# Patient Record
Sex: FEMALE | Race: WHITE | NOT HISPANIC OR LATINO | Employment: OTHER | URBAN - METROPOLITAN AREA
[De-identification: names, ages, dates, MRNs, and addresses within clinical notes are randomized per-mention and may not be internally consistent; named-entity substitution may affect disease eponyms.]

---

## 2023-08-08 ENCOUNTER — HOSPITAL ENCOUNTER (OUTPATIENT)
Dept: DATA CONVERSION | Facility: HOSPITAL | Age: 73
End: 2023-08-08

## 2023-08-28 ENCOUNTER — HOSPITAL ENCOUNTER (OUTPATIENT)
Dept: DATA CONVERSION | Facility: HOSPITAL | Age: 73
Discharge: HOME | End: 2023-08-28
Payer: MEDICARE

## 2023-08-28 DIAGNOSIS — M48.062 SPINAL STENOSIS, LUMBAR REGION WITH NEUROGENIC CLAUDICATION: ICD-10-CM

## 2023-09-07 ENCOUNTER — HOSPITAL ENCOUNTER (OUTPATIENT)
Dept: DATA CONVERSION | Facility: HOSPITAL | Age: 73
End: 2023-09-07

## 2023-09-07 DIAGNOSIS — M17.11 UNILATERAL PRIMARY OSTEOARTHRITIS, RIGHT KNEE: ICD-10-CM

## 2023-09-14 ENCOUNTER — HOSPITAL ENCOUNTER (OUTPATIENT)
Dept: DATA CONVERSION | Facility: HOSPITAL | Age: 73
Discharge: HOME | End: 2023-09-14
Payer: MEDICARE

## 2023-09-14 DIAGNOSIS — E78.1 PURE HYPERGLYCERIDEMIA: ICD-10-CM

## 2023-09-14 DIAGNOSIS — I10 ESSENTIAL (PRIMARY) HYPERTENSION: ICD-10-CM

## 2023-09-14 DIAGNOSIS — E66.9 OBESITY, UNSPECIFIED: ICD-10-CM

## 2023-09-14 LAB
ALBUMIN SERPL-MCNC: 4 GM/DL (ref 3.5–5)
ALBUMIN/GLOB SERPL: 1.4 RATIO (ref 1.5–3)
ALP BLD-CCNC: 50 U/L (ref 35–125)
ALT SERPL-CCNC: 29 U/L (ref 5–40)
ANION GAP SERPL CALCULATED.3IONS-SCNC: 9 MMOL/L (ref 0–19)
APPEARANCE PLAS: NORMAL
AST SERPL-CCNC: 32 U/L (ref 5–40)
BASOPHILS # BLD AUTO: 0.03 K/UL (ref 0–0.22)
BASOPHILS NFR BLD AUTO: 0.4 % (ref 0–1)
BILIRUB SERPL-MCNC: 0.4 MG/DL (ref 0.1–1.2)
BUN SERPL-MCNC: 12 MG/DL (ref 8–25)
BUN/CREAT SERPL: 13.3 RATIO (ref 8–21)
CALCIUM SERPL-MCNC: 9.7 MG/DL (ref 8.5–10.4)
CHLORIDE SERPL-SCNC: 108 MMOL/L (ref 97–107)
CHOLEST SERPL-MCNC: 176 MG/DL (ref 133–200)
CHOLEST/HDLC SERPL: 2.3 RATIO
CO2 SERPL-SCNC: 28 MMOL/L (ref 24–31)
COLOR SPUN FLD: NORMAL
CREAT SERPL-MCNC: 0.9 MG/DL (ref 0.4–1.6)
DEPRECATED RDW RBC AUTO: 46.5 FL (ref 37–54)
DIFFERENTIAL METHOD BLD: ABNORMAL
EOSINOPHIL # BLD AUTO: 0.25 K/UL (ref 0–0.45)
EOSINOPHIL NFR BLD: 3.7 % (ref 0–3)
ERYTHROCYTE [DISTWIDTH] IN BLOOD BY AUTOMATED COUNT: 13.2 % (ref 11.7–15)
FASTING STATUS PATIENT QL REPORTED: NORMAL
GFR SERPL CREATININE-BSD FRML MDRD: 68 ML/MIN/1.73 M2
GLOBULIN SER-MCNC: 2.8 G/DL (ref 1.9–3.7)
GLUCOSE SERPL-MCNC: 97 MG/DL (ref 65–99)
HCT VFR BLD AUTO: 42.6 % (ref 36–44)
HDLC SERPL-MCNC: 77 MG/DL
HGB BLD-MCNC: 13.8 GM/DL (ref 12–15)
IMM GRANULOCYTES # BLD AUTO: 0.03 K/UL (ref 0–0.1)
LDLC SERPL CALC-MCNC: 82 MG/DL (ref 65–130)
LYMPHOCYTES # BLD AUTO: 1.5 K/UL (ref 1.2–3.2)
LYMPHOCYTES NFR BLD MANUAL: 22.1 % (ref 20–40)
MCH RBC QN AUTO: 31.3 PG (ref 26–34)
MCHC RBC AUTO-ENTMCNC: 32.4 % (ref 31–37)
MCV RBC AUTO: 96.6 FL (ref 80–100)
MONOCYTES # BLD AUTO: 0.41 K/UL (ref 0–0.8)
MONOCYTES NFR BLD MANUAL: 6 % (ref 0–8)
NEUTROPHILS # BLD AUTO: 4.56 K/UL
NEUTROPHILS # BLD AUTO: 4.56 K/UL (ref 1.8–7.7)
NEUTROPHILS.IMMATURE NFR BLD: 0.4 % (ref 0–1)
NEUTS SEG NFR BLD: 67.4 % (ref 50–70)
NRBC BLD-RTO: 0 /100 WBC
PLATELET # BLD AUTO: 197 K/UL (ref 150–450)
PMV BLD AUTO: 10.3 CU (ref 7–12.6)
POTASSIUM SERPL-SCNC: 4.2 MMOL/L (ref 3.4–5.1)
PROT SERPL-MCNC: 6.8 G/DL (ref 5.9–7.9)
RBC # BLD AUTO: 4.41 M/UL (ref 4–4.9)
SODIUM SERPL-SCNC: 144 MMOL/L (ref 133–145)
TRIGL SERPL-MCNC: 85 MG/DL (ref 40–150)
TSH SERPL DL<=0.05 MIU/L-ACNC: 0.42 MIU/L (ref 0.27–4.2)
WBC # BLD AUTO: 6.8 K/UL (ref 4.5–11)

## 2023-09-16 ENCOUNTER — HOSPITAL ENCOUNTER (OUTPATIENT)
Facility: HOSPITAL | Age: 73
Setting detail: OUTPATIENT SURGERY
End: 2023-09-16
Attending: ORTHOPAEDIC SURGERY | Admitting: ORTHOPAEDIC SURGERY
Payer: MEDICARE

## 2023-09-19 PROBLEM — M70.22 OLECRANON BURSITIS OF LEFT ELBOW: Status: ACTIVE | Noted: 2018-07-26

## 2023-09-19 PROBLEM — I10 ESSENTIAL (PRIMARY) HYPERTENSION: Status: ACTIVE | Noted: 2018-10-23

## 2023-09-19 PROBLEM — R20.0 NUMBNESS: Status: ACTIVE | Noted: 2023-09-19

## 2023-09-19 PROBLEM — F17.200 SMOKER: Status: ACTIVE | Noted: 2023-09-19

## 2023-09-19 PROBLEM — Z90.722 S/P TAH-BSO: Status: ACTIVE | Noted: 2019-01-15

## 2023-09-19 PROBLEM — M54.50 LOW BACK PAIN: Status: ACTIVE | Noted: 2019-01-15

## 2023-09-19 PROBLEM — E66.9 OBESITY: Status: ACTIVE | Noted: 2021-03-22

## 2023-09-19 PROBLEM — R93.89 ABNORMAL CT SCAN, CHEST: Status: ACTIVE | Noted: 2023-09-19

## 2023-09-19 PROBLEM — I25.10 CAD (CORONARY ARTERY DISEASE): Status: ACTIVE | Noted: 2020-01-28

## 2023-09-19 PROBLEM — C34.31 MALIGNANT NEOPLASM OF LOWER LOBE OF RIGHT LUNG (MULTI): Status: ACTIVE | Noted: 2023-09-19

## 2023-09-19 PROBLEM — R59.0 ANTERIOR CERVICAL LYMPHADENOPATHY: Status: ACTIVE | Noted: 2018-05-23

## 2023-09-19 PROBLEM — R91.1 SOLITARY PULMONARY NODULE: Status: ACTIVE | Noted: 2023-09-19

## 2023-09-19 PROBLEM — M06.9 RHEUMATOID ARTHRITIS (MULTI): Status: ACTIVE | Noted: 2019-01-15

## 2023-09-19 PROBLEM — R91.1 LUNG NODULE: Status: ACTIVE | Noted: 2022-05-12

## 2023-09-19 PROBLEM — K14.8 TONGUE LUMP: Status: ACTIVE | Noted: 2018-05-22

## 2023-09-19 PROBLEM — J31.2 CHRONIC PHARYNGITIS: Status: ACTIVE | Noted: 2019-01-15

## 2023-09-19 PROBLEM — Z90.710 S/P TAH-BSO: Status: ACTIVE | Noted: 2019-01-15

## 2023-09-19 PROBLEM — Z90.79 S/P TAH-BSO: Status: ACTIVE | Noted: 2019-01-15

## 2023-09-19 PROBLEM — M51.27 LUMBOSACRAL DISC HERNIATION: Status: ACTIVE | Noted: 2018-09-19

## 2023-09-19 PROBLEM — E78.1 PURE HYPERGLYCERIDEMIA: Status: ACTIVE | Noted: 2018-10-23

## 2023-09-19 RX ORDER — FOLIC ACID 1 MG/1
2 TABLET ORAL DAILY
COMMUNITY

## 2023-09-19 RX ORDER — IBUPROFEN 100 MG/5ML
1000 SUSPENSION, ORAL (FINAL DOSE FORM) ORAL DAILY
COMMUNITY

## 2023-09-19 RX ORDER — HYDROXYCHLOROQUINE SULFATE 200 MG/1
1 TABLET, FILM COATED ORAL DAILY
COMMUNITY

## 2023-09-19 RX ORDER — ACETAMINOPHEN 500 MG
1 TABLET ORAL 2 TIMES DAILY
COMMUNITY
Start: 2021-05-11

## 2023-09-19 RX ORDER — GLUCOSAMINE HCL 500 MG
TABLET ORAL
COMMUNITY
End: 2024-03-20 | Stop reason: WASHOUT

## 2023-09-19 RX ORDER — METHOTREXATE 25 MG/ML
INJECTION INTRA-ARTERIAL; INTRAMUSCULAR; INTRATHECAL; INTRAVENOUS
COMMUNITY
Start: 2016-08-01 | End: 2024-03-20 | Stop reason: WASHOUT

## 2023-09-19 RX ORDER — LISINOPRIL 20 MG/1
20 TABLET ORAL DAILY
COMMUNITY
Start: 2023-06-05 | End: 2024-03-12

## 2023-09-19 RX ORDER — ABATACEPT 125 MG/ML
INJECTION, SOLUTION SUBCUTANEOUS
COMMUNITY
Start: 2023-08-29

## 2023-09-19 RX ORDER — INFLIXIMAB 100 MG/10ML
INJECTION, POWDER, LYOPHILIZED, FOR SOLUTION INTRAVENOUS
COMMUNITY
Start: 2017-01-31 | End: 2024-03-20 | Stop reason: WASHOUT

## 2023-09-19 RX ORDER — METHOTREXATE 15 MG/.3ML
INJECTION, SOLUTION SUBCUTANEOUS
COMMUNITY
Start: 2023-07-20

## 2023-09-19 RX ORDER — CETIRIZINE HYDROCHLORIDE 10 MG/1
10 TABLET ORAL DAILY
COMMUNITY
Start: 2018-05-23

## 2023-09-19 RX ORDER — GABAPENTIN 300 MG/1
1 CAPSULE ORAL 3 TIMES DAILY
COMMUNITY
Start: 2015-08-24

## 2023-09-19 RX ORDER — TRAZODONE HYDROCHLORIDE 100 MG/1
TABLET ORAL
COMMUNITY
Start: 2015-08-24 | End: 2024-03-20 | Stop reason: DRUGHIGH

## 2023-09-19 RX ORDER — ROSUVASTATIN CALCIUM 20 MG/1
20 TABLET, COATED ORAL DAILY
COMMUNITY

## 2023-09-19 RX ORDER — ACETAMINOPHEN 500 MG
50 TABLET ORAL DAILY
COMMUNITY
Start: 2021-05-11

## 2023-09-19 RX ORDER — ALENDRONATE SODIUM 70 MG/1
TABLET ORAL
COMMUNITY
Start: 2015-08-24 | End: 2024-03-20 | Stop reason: WASHOUT

## 2023-09-19 RX ORDER — TRAMADOL HYDROCHLORIDE AND ACETAMINOPHEN 37.5; 325 MG/1; MG/1
TABLET, FILM COATED ORAL
COMMUNITY
Start: 2016-11-21 | End: 2024-03-20 | Stop reason: WASHOUT

## 2023-09-19 RX ORDER — MAGNESIUM GLUCONATE 27.5 (500)
500 TABLET ORAL 2 TIMES DAILY
COMMUNITY
Start: 2021-05-11

## 2023-09-19 RX ORDER — NAPROXEN SODIUM 220 MG/1
1 TABLET ORAL DAILY
COMMUNITY

## 2023-09-19 RX ORDER — TRAZODONE HYDROCHLORIDE 300 MG/1
0.5 TABLET ORAL NIGHTLY
COMMUNITY
Start: 2018-05-23

## 2023-09-19 RX ORDER — MELOXICAM 15 MG/1
TABLET ORAL
COMMUNITY
Start: 2015-08-24 | End: 2024-03-20 | Stop reason: WASHOUT

## 2023-09-19 RX ORDER — OXYCODONE HYDROCHLORIDE 5 MG/1
5 TABLET ORAL EVERY 6 HOURS PRN
COMMUNITY
Start: 2022-11-29 | End: 2024-03-20 | Stop reason: WASHOUT

## 2023-09-19 RX ORDER — TRAMADOL HYDROCHLORIDE 50 MG/1
50 TABLET ORAL DAILY
COMMUNITY
Start: 2023-01-06 | End: 2024-03-20 | Stop reason: WASHOUT

## 2023-09-19 RX ORDER — ACETAMINOPHEN 160 MG/5ML
200 SUSPENSION, ORAL (FINAL DOSE FORM) ORAL DAILY
COMMUNITY
Start: 2021-05-11

## 2023-09-19 RX ORDER — ZINC GLUCONATE 50 MG
1 TABLET ORAL DAILY
COMMUNITY
Start: 2021-05-11

## 2023-09-19 RX ORDER — LANOLIN ALCOHOL/MO/W.PET/CERES
2 CREAM (GRAM) TOPICAL DAILY
COMMUNITY
Start: 2021-05-11

## 2023-09-19 RX ORDER — TORSEMIDE 10 MG/1
TABLET ORAL
COMMUNITY
Start: 2016-06-02 | End: 2024-03-20 | Stop reason: SDUPTHER

## 2023-09-19 RX ORDER — ESZOPICLONE 3 MG/1
3 TABLET, FILM COATED ORAL NIGHTLY
COMMUNITY

## 2023-10-12 ENCOUNTER — APPOINTMENT (OUTPATIENT)
Dept: RADIOLOGY | Facility: CLINIC | Age: 73
End: 2023-10-12
Payer: MEDICARE

## 2023-10-12 ENCOUNTER — ANCILLARY PROCEDURE (OUTPATIENT)
Dept: RADIOLOGY | Facility: CLINIC | Age: 73
End: 2023-10-12
Payer: MEDICARE

## 2023-10-12 DIAGNOSIS — C34.31 MALIGNANT NEOPLASM OF LOWER LOBE, RIGHT BRONCHUS OR LUNG (MULTI): ICD-10-CM

## 2023-10-12 DIAGNOSIS — C34.32 MALIGNANT NEOPLASM OF LOWER LOBE, LEFT BRONCHUS OR LUNG (MULTI): ICD-10-CM

## 2023-10-12 PROCEDURE — 71250 CT THORAX DX C-: CPT

## 2023-10-26 ENCOUNTER — APPOINTMENT (OUTPATIENT)
Dept: PREADMISSION TESTING | Facility: HOSPITAL | Age: 73
End: 2023-10-26
Payer: MEDICARE

## 2023-10-26 ENCOUNTER — APPOINTMENT (OUTPATIENT)
Dept: HEMATOLOGY/ONCOLOGY | Facility: CLINIC | Age: 73
End: 2023-10-26
Payer: MEDICARE

## 2023-11-01 NOTE — PROGRESS NOTES
Subjective:   Patient Name: Lorraine Iniguez    : 1950     MRN: [id number]     Age: 73 y.o.     Gender: female  Referring Provider: ZENA  Pt number: [phone]    CC: Management of newly diagnosed working stage IA3  ([id number]/1c ) squamous cell lung cancer. PDL-1 2%. NGS not done due to insufficient tissue.  22: RLL wedge resection: NSCLC with predominantly squamous cell and components of glandular findings. Stage IA2 ([id number]), PDL-1 <1%. NGS KALYANI, TP53 [id number].      Presenting History (10/11/2022):  Ms Iniguez is a 72-year-old F, active smoker (0.5 pack per day for ~ 60 years, 30 py smoking) with PMHx of RA  (dx in late 30s, currently controlled with MTX and Abatacept) who is referred here for the management of newly diagnosed likely metastatic squamous cell lung cancer. She has been having long-standing  long nodules that potentially related to her RA, and it has been followed with serial scans. She had LLL nodule biopsy in 10/2017 which  showed necrotic tissue with granulation tissue proliferation, fibrosis and chronic active inflammation. She had another RLL CT guided biopsy in 2020 which again negative for malignancy. On the LDCT screening scan on 2022, she was found to have  multiple bilateral lung nodules. She underwent PET/CT on 2022 which showed multiple new pulmonary nodules are seen in bilateral  lungs suspicious for neoplasm. Metastatic disease cannot be excluded. No axillary, hilar or mediastinal adenopathy. Stable R hepatic lobe cyst measuring 14mm without radiotracer activity. Unchanged renal cyst. She underwent CT-guided lung biopsy of RLL  nodule on 09/15/2022 with pathology revealing invasive squamous cell carcinoma. IHC positive for p40, CK7, CK5/6 and some staining for TTF-1. Negative for synaptophysin, CK20, napsin A and chromogranin.   She presented to clinic today accompanied by her daughter.  She is overall doing well.  She endorses dyspnea on exertion if  walking for a long distance.  She also uses a cane if she needs to walk outside.  She has mild cough which was stable.  She denies  any hemoptysis, fever, chills, nausea, vomiting, diarrhea, headache, or visual change.  She does have chronic joint pain which varies.  She endorses left knee pain and R sided chest pain since the biopsy, which is well controlled by over-the-counter pain  medication.     Treatment Summary:   11/28/2022: RLL wedge resection (Dr. Shi)    Interval History (11/2/2023):  Patient presents for a follow up visit. She unfortunately continues to have significant back pain and knee pain (R sided). She canceled her knee replacement surgery due to concern that the surgery may make the pain worse. She otherwise feels fine - no SOB or cough. Eating and voiding well. No n/v/d/c. Continues to follow with her pain management doctor for pain control.       ROS:  Patient denies the below review of systems, except for changes as noted in the interval history.    General:  Fatigue, anorexia, fevers, sweats, chills, heat/cold intolerance, weight changes.  HEENT:  Icterus, congestion, sore throat, rhinorrhea, taste change, voice changes.  Neurology:  Headache, dizziness, vision changes, hearing changes, other motor/sensory loss, gait instability, neuropathies.  Pulmonology:  Cough, wheezing, hemoptysis, dyspnea at rest, dyspnea on exertion, pleuritic chest pain.  Cardiology:  Chest pain, palpitations, orthopnea, paroxysmal nocturnal dyspnea.  Gastroenterology:  Nausea, vomiting, reflux symptoms, abdominal pain, constipation, diarrhea, melena, bright red blood per rectum.  Genitourinary:  Dysuria, polyuria, urine color change, urine smell change, hematuria.   Musculoskeletal:  Arthralgias, myalgias, joint swelling, focal weakness.  Skin:  Rash, pruritis, jaundice, petechiae, nail changes, skin nodules/growth.  Psychology:  Anxiety, depression, suicidal ideation, homicidal ideation.  Heme:  Easy bleeding or  bruising.  Others:   All other systems reviewed and are negative.    Medical History:   No past medical history on file.    Surgical History:   Past Surgical History:   Procedure Laterality Date    CT GUIDED IMAGING FOR NEEDLE PLACEMENT  10/12/2017    CT GUIDED IMAGING FOR NEEDLE PLACEMENT [facility]    CT GUIDED IMAGING FOR NEEDLE PLACEMENT  7/10/2020    CT GUIDED IMAGING FOR NEEDLE PLACEMENT [facility]    CT GUIDED PERCUTANEOUS BIOPSY LUNG  11/30/2022    CT GUIDED PERCUTANEOUS BIOPSY LUNG [facility]       Family History:  No family history on file.    Allergies:  No Known Allergies      Meds (Current):    Current Outpatient Medications:     abatacept (Orencia, with maltose,) 250 mg injection, Infuse 250 mg into a venous catheter.  q4 wks, Disp: , Rfl:     ascorbic acid (Vitamin C) 1,000 mg tablet, Take 1 tablet (1,000 mg) by mouth once daily.  for 30 day(s), Disp: , Rfl:     BABY ASPIRIN ORAL, Baby Aspirin, Disp: , Rfl:     calcium carbonate-vitamin D3 (Caltrate with Vitamin D3) 600 mg-20 mcg (800 unit) tablet, Take 1 tablet by mouth 2 times a day., Disp: , Rfl:     cetirizine (ZyrTEC) 10 mg tablet, Take 1 tablet (10 mg) by mouth once daily., Disp: , Rfl:     cholecalciferol (Vitamin D3) 50 mcg (2,000 unit) capsule, Take 1 capsule (50 mcg) by mouth early in the morning.., Disp: , Rfl:     coenzyme Q-10 (Co Q-10) 200 mg capsule, Take 1 capsule (200 mg) by mouth once daily., Disp: , Rfl:     cyanocobalamin (Vitamin B-12) 1,000 mcg tablet, Take 2 tablets (2,000 mcg) by mouth once daily., Disp: , Rfl:     denosumab (PROLIA SUBQ), Inject 60 mg under the skin. q6 mo, Disp: , Rfl:     eszopiclone (Lunesta) 3 mg tablet, Take 1 tablet (3 mg) by mouth once daily at bedtime., Disp: , Rfl:     folic acid (Folvite) 1 mg tablet, Take 2 tablets (2 mg) by mouth once daily., Disp: , Rfl:     gabapentin (Neurontin) 300 mg capsule, Take 1 capsule (300 mg) by mouth 3 times a day., Disp: , Rfl:      glucosamine/chondr hayes A sod (OSTEO BI-FLEX ORAL), 0 Refill(s), Type: Maintenance, Disp: , Rfl:     hydroxychloroquine (Plaquenil) 200 mg tablet, Take 1 tablet (200 mg) by mouth once daily., Disp: , Rfl:     inFLIXimab (Remicade) 100 mg injection, Remicade 100 MG Intravenous Solution Reconstituted  Refills: 0     Ej Alvarez MD   Start : 31-Jan-2017  Active, Disp: , Rfl:     Lactobacil 2/Bifido 11/S.therm (HIGH POTENCY PROBIOTIC ORAL), Take by mouth once daily., Disp: , Rfl:     lisinopril 20 mg tablet, Take 1 tablet (20 mg) by mouth once daily., Disp: , Rfl:     magnesium gluconate (Magonate) 27.5 mg magne- sium (500 mg) tablet, Take 500 mg by mouth 2 times a day., Disp: , Rfl:     NON FORMULARY, Elderberry 500 MG as directed Orally, Disp: , Rfl:     omega 3-dha-epa-fish oil (Fish OiL) 1,200 (144-216) mg capsule, Take 1 capsule (1,200 mg) by mouth once daily., Disp: , Rfl:     POTASSIUM ORAL, Take 99 mg by mouth once daily. for 30 day(s), Disp: , Rfl:     Rasuvo, PF, 15 mg/0.3 mL auto-injector, , Disp: , Rfl:     torsemide (Demadex) 10 mg tablet, Torsemide 10 MG Oral Tablet  Quantity: 90  Refills: 0      Start : 2-Jun-2016  Active, Disp: , Rfl:     traZODone (Desyrel) 300 mg tablet, Take 0.5 tablets (150 mg) by mouth once daily at bedtime.  for 30 day(s), Disp: , Rfl:     vitamin B complex (SUPER B-50 COMPLEX ORAL), Super B Complex, Disp: , Rfl:     zinc gluconate 50 mg tablet, Take 1 tablet (50 mg) by mouth once daily.  for 30 day(s), Disp: , Rfl:     albuterol 108 (90 Base) MCG/ACT inhaler, Inhale 2 puffs every 4 hours., Disp: , Rfl:     alendronate (Fosamax) 70 mg tablet, Alendronate Sodium 70 MG Oral Tablet  Quantity: 12  Refills: 0      Start : 24-Aug-2015  Active, Disp: , Rfl:     meloxicam (Mobic) 15 mg tablet, Meloxicam 15 MG Oral Tablet  Quantity: 90  Refills: 0      Start : 24-Aug-2015  Active, Disp: , Rfl:     methotrexate (Trexall) 15 mg tablet, Take 1 tablet (15 mg total) by mouth., Disp: ,  Rfl:     methotrexate 25 mg/mL, Methotrexate Sodium (PF) 50 MG/2ML Injection Solution  Quantity: 8  Refills: 0      Start : 1-Aug-2016  Active, Disp: , Rfl:     methotrexate sodium/PF (methotrexate, PF,) 50 mg/mL solution chemo injection, 50 mg/2mL  Injection solution, Disp: , Rfl:     Orencia ClickJect 125 mg/mL auto-injector auto-injection, , Disp: , Rfl:     oxyCODONE (Roxicodone) 5 mg immediate release tablet, Take 1 tablet (5 mg) by mouth every 6 hours if needed for moderate pain (4 - 6)., Disp: , Rfl:     rosuvastatin (Crestor) 20 mg tablet, Take 1 tablet (20 mg) by mouth once daily., Disp: , Rfl:     traMADol (Ultram) 50 mg tablet, Take 1 tablet (50 mg) by mouth once daily., Disp: , Rfl:     traMADoL-acetaminophen (UltraCET) 37.5-325 mg tablet, traMADol-Acetaminophen 37.5-325 MG Oral Tablet  Quantity: 720  Refills: 0      Start : 21-Nov-2016  Active, Disp: , Rfl:     traZODone (Desyrel) 100 mg tablet, traZODone HCl - 100 MG Oral Tablet  Quantity: 270  Refills: 0      Start : 24-Aug-2015  Active, Disp: , Rfl:     turmeric (CURCUMIN MISC), Take 1 capsule by mouth 2 times a day., Disp: , Rfl:     ubidecarenone (coenzyme Q10) 100 mg tablet, as directed Orally, Disp: , Rfl:       Performance Status (ECOG): 1         ECOG Definition  0 Fully active; no performance restrictions.  1 Strenuous physical activity restricted; fully ambulatory and able to carry out light work.  2 Capable of all self-care but unable to carry out any work activities. Up and about >50% of waking hours.  3 Capable of only limited self-care; confined to bed or chair >50% of waking hours.  4 Completely disabled; cannot carry out any self-care; totally confined to bed or chair.      Exam:  /88 (BP Location: Left arm, Patient Position: Sitting, BP Cuff Size: Adult)   Pulse 80   Temp 36.5 °C (97.7 °F) (Temporal)   Resp 17   Wt 94.3 kg (208 lb 0.1 oz)   SpO2 97%   BMI 31.89 kg/m²   General: Well appearing, no acute distress  Neurology:  Alert and oriented x3, CN II-XII grossly intact  Psychology: Affect appropriate  Eyes: PERRL, EOMI  ENT: Mucus membranes moist and intact, no ulcers  Lymphatics: No palpable adenopathy  Neck: Supple, no masses  Respiratory: Lungs clear bilaterally, no wheezes, no rales, no rhonchi  Cardiovascular: Normal rate and rhythm, no murmur  Abdomen: Soft, non-tender, non-distended, normoactive, no hepatosplenomegaly, no ascites  Musculoskeletal: Normal gait and stance  Extremities: No clubbing, no cyanosis, no edema  Skin: No rash  Genitourinary: Deferred  Rectal: Deferred   Pelvic: Deferred  Breasts: Deferred    Labs:  No visits with results within 3 Day(s) from this visit.   Latest known visit with results is:   Hospital Outpatient Visit on 09/14/2023   Component Date Value Ref Range Status    Calcium 09/14/2023 9.7  8.5 - 10.4 MG/DL Final    AST 09/14/2023 32  5 - 40 U/L Final    Alkaline Phosphatase 09/14/2023 50  35 - 125 U/L Final    Total Bilirubin 09/14/2023 0.4  0.1 - 1.2 MG/DL Final    Total Protein 09/14/2023 6.8  5.9 - 7.9 G/DL Final    Albumin 09/14/2023 4.0  3.5 - 5.0 GM/DL Final    Globulin, Total 09/14/2023 2.8  1.9 - 3.7 G/DL Final    A/G Ratio 09/14/2023 1.4 (L)  1.5 - 3.0 RATIO Final    Sodium 09/14/2023 144  133 - 145 MMOL/L Final    Potassium 09/14/2023 4.2  3.4 - 5.1 MMOL/L Final    Chloride 09/14/2023 108 (H)  97 - 107 MMOL/L Final    Bicarbonate 09/14/2023 28  24 - 31 MMOL/L Final    Anion Gap 09/14/2023 9  0 - 19 MMOL/L Final    Urea Nitrogen 09/14/2023 12  8 - 25 MG/DL Final    Creatinine 09/14/2023 0.9  0.4 - 1.6 MG/DL Final    Urea Nitrogen/Creatinine (g/g) Uri* 09/14/2023 13.3  8 - 21 RATIO Final    Glucose 09/14/2023 97  65 - 99 MG/DL Final    ALT (SGPT) 09/14/2023 29  5 - 40 U/L Final    ESTIMATED GFR 09/14/2023 68  mL/min/1.73 m2 Final    Comment: CALCULATIONS OF ESTIMATED GFR ARE PERFORMED USING THE 2021 CKD-EPI   STUDY REFIT EQUATION WITHOUT THE RACE VARIABLE FOR THE IDMS-TRACEABLE    CREATININE METHODS.  https://jasn.asnjournals.org/content/early/2021/09/22/ASN.[email or web address]  Performed at [address] [place] [facility] [address]      Differential Type 09/14/2023 AUTO DIFF   Final    Immature Granulocyte %, Automated 09/14/2023 0.40  0.0 - 1.0 % Final    Neutrophil 09/14/2023 67.40  50 - 70 % Final    Lymphocytes % 09/14/2023 22.10  20 - 40 % Final    Monocytes % 09/14/2023 6.00  0 - 8 % Final    Eosinophil 09/14/2023 3.70 (H)  0 - 3 % Final    Basophils % 09/14/2023 0.40  0 - 1 % Final    Immature Granulocytes Absolute, Au* 09/14/2023 0.03  0.0 - 0.1 K/UL Final    Neutrophils Absolute 09/14/2023 4.56  1.8 - 7.7 K/UL Final    Lymphocytes Absolute 09/14/2023 1.50  1.2 - 3.2 K/UL Final    Monocytes Absolute 09/14/2023 0.41  0 - 0.8 K/UL Final    Eosinophils Absolute 09/14/2023 0.25  0 - 0.45 K/UL Final    Basophils Absolute 09/14/2023 0.03  0.00 - 0.22 K/UL Final    WBC 09/14/2023 6.8  4.5 - 11.0 K/UL Final    RBC 09/14/2023 4.41  4.0 - 4.9 M/UL Final    Hemoglobin 09/14/2023 13.8  12.0 - 15.0 GM/DL Final    Hematocrit 09/14/2023 42.6  36 - 44 % Final    MCV 09/14/2023 96.6  80 - 100 FL Final    MCH 09/14/2023 31.3  26 - 34 PG Final    MCHC 09/14/2023 32.4  31 - 37 % Final    RDW-SD 09/14/2023 46.5  37.0 - 54.0 FL Final    RDW-CV 09/14/2023 13.2  11.7 - 15.0 % Final    Platelets 09/14/2023 197  150 - 450 K/UL Final    MPV 09/14/2023 10.3  7.0 - 12.6 CU Final    nRBC 09/14/2023 0  0 /100 WBC Final    ABSOLUTE NEUTROPHIL CALCULATED 09/14/2023 4.56  K/UL Final    Performed at [address] [address]    SERUM COLOR 09/14/2023    Final                    Value:YELLOW  Performed at [facility],[address],[facility] [address]      SERUM CLARITY 09/14/2023    Final                    Value:CLEAR  Performed at [address] [address]      Is the patient fasting? 09/14/2023    Final                    Value:FASTING  Performed at [facility]  "[place],[address] [place] [address],[place] [address]      Cholesterol 09/14/2023 176  133 - 200 MG/DL Final    Triglycerides 09/14/2023 85  40 - 150 MG/DL Final    HDL 09/14/2023 77  >50 MG/DL Final    Comment: National Cholesterol Education Program(NCFP)guidelines:   <40 mg/dl:Low HDL-cholesterol(major risk factor for CHD)   >60 mg/dl:High HDL-cholesterol(\"negative\"risk factor for CHD)   HDL-cholesterol is affected by a number of factors,e.g.,smoking,  exercise,hormones,sex and age.      LDL Calculated 09/14/2023 82  65 - 130 MG/DL Final    Cholesterol/HDL Ratio 09/14/2023 2.3  RATIO Final    Comment: According to the American Heart Association, the goal is to maintain   the total cholesterol/HDL ratio at 5-to-1 or lower with an optimum   ratio of 3.5-to-1.  Performed at [address] [address]      Thyroid Stimulating Hormone 09/14/2023 0.42  0.27 - 4.20 MIU/L Final    Performed at [address] [address]        Assessment/Plan:   73 y.o. female RA who is referred here for the management of newly diagnosed  working stage IV metastatic squamous cell lung cancer. PDL-1 2%; NGS: insufficient tissue. She had RLL wedge resection with Dr. Shi  on 11/28/2022, [id number]. Doing well so far.      Plan:    The patient's records and imaging have been reviewed in detail.  I have discussed with the patient the natural history of their disease  at length.  The following has also been discussed with the patient:     # Working stage IA2 ([id number])) squamous cell lung cancer, PDL-1 2%. She underwent  RLL wedge resection with Dr. Shi on 11/28/22 with pathology revealing invasive NSCLC predominantly squamous cell features and focal glandular components tumor measure 1.6cm. All LN including 7, 8 10R were negative (0/10-11). No LVI or VPI. Margins  were negative. PDL-1 <1%. NGS KALYANI, TP53 [id number]. We discussed that given the small size of her tumor, no adjuvant chemotherapy is indicated in this " setting. We will continue with surveillance scan every 6 months for 2-3 years and annually.                 - Interval Imaging: 10/12/2023: CT chest (-) 11.  No lymphadenopathy in the chest. Previously noted subcarinal lymph node less conspicuous 2. Stable appearing pulmonary nodules 3. Chronic appearing interstitial changes stable     # RA: follows with Dr. Betsy Prabhakar [phone].      # Clinical Trials:  None currently.      # Access: Peripheral veins.     # Pain: Pain due to RA. Had recent flare. Currently on tramadol as needed. Pending orthopedic appt. Will refer to integrative medicine for uncontrolled back pain and knee pain.      # Bone Health: No signs of bony disease.     # Psychosocial: Coping well, no acute issues.  Good support from family & friends.       # GI/CINV: No acute issues.  Eating and voiding well.       # Smoking: Former smoker, quitted in 2022     # Fertility: N/A.  Oncofertility support available as needed.       # Heme/ESAs: N/A.     # GOC: Patient is aware of curative intent goals of care.     # Language: English.     # Dispo: RTC in 6 months with scans prior

## 2023-11-02 ENCOUNTER — OFFICE VISIT (OUTPATIENT)
Dept: HEMATOLOGY/ONCOLOGY | Facility: CLINIC | Age: 73
End: 2023-11-02
Payer: MEDICARE

## 2023-11-02 VITALS
DIASTOLIC BLOOD PRESSURE: 88 MMHG | TEMPERATURE: 97.7 F | SYSTOLIC BLOOD PRESSURE: 166 MMHG | BODY MASS INDEX: 31.89 KG/M2 | OXYGEN SATURATION: 97 % | HEART RATE: 80 BPM | WEIGHT: 208 LBS | RESPIRATION RATE: 17 BRPM

## 2023-11-02 DIAGNOSIS — M54.50 CHRONIC MIDLINE LOW BACK PAIN WITHOUT SCIATICA: ICD-10-CM

## 2023-11-02 DIAGNOSIS — G89.29 CHRONIC MIDLINE LOW BACK PAIN WITHOUT SCIATICA: ICD-10-CM

## 2023-11-02 DIAGNOSIS — M06.9: ICD-10-CM

## 2023-11-02 DIAGNOSIS — C34.31 MALIGNANT NEOPLASM OF LOWER LOBE OF RIGHT LUNG (MULTI): Primary | ICD-10-CM

## 2023-11-02 PROCEDURE — 1125F AMNT PAIN NOTED PAIN PRSNT: CPT | Performed by: STUDENT IN AN ORGANIZED HEALTH CARE EDUCATION/TRAINING PROGRAM

## 2023-11-02 PROCEDURE — 3077F SYST BP >= 140 MM HG: CPT | Performed by: STUDENT IN AN ORGANIZED HEALTH CARE EDUCATION/TRAINING PROGRAM

## 2023-11-02 PROCEDURE — 99213 OFFICE O/P EST LOW 20 MIN: CPT | Performed by: STUDENT IN AN ORGANIZED HEALTH CARE EDUCATION/TRAINING PROGRAM

## 2023-11-02 PROCEDURE — 3079F DIAST BP 80-89 MM HG: CPT | Performed by: STUDENT IN AN ORGANIZED HEALTH CARE EDUCATION/TRAINING PROGRAM

## 2023-11-02 ASSESSMENT — ENCOUNTER SYMPTOMS
OCCASIONAL FEELINGS OF UNSTEADINESS: 0
DEPRESSION: 0
LOSS OF SENSATION IN FEET: 0

## 2023-11-02 ASSESSMENT — PATIENT HEALTH QUESTIONNAIRE - PHQ9
2. FEELING DOWN, DEPRESSED OR HOPELESS: NOT AT ALL
SUM OF ALL RESPONSES TO PHQ9 QUESTIONS 1 AND 2: 0
1. LITTLE INTEREST OR PLEASURE IN DOING THINGS: NOT AT ALL

## 2023-11-02 ASSESSMENT — COLUMBIA-SUICIDE SEVERITY RATING SCALE - C-SSRS
6. HAVE YOU EVER DONE ANYTHING, STARTED TO DO ANYTHING, OR PREPARED TO DO ANYTHING TO END YOUR LIFE?: NO
1. IN THE PAST MONTH, HAVE YOU WISHED YOU WERE DEAD OR WISHED YOU COULD GO TO SLEEP AND NOT WAKE UP?: NO
2. HAVE YOU ACTUALLY HAD ANY THOUGHTS OF KILLING YOURSELF?: NO

## 2023-11-02 ASSESSMENT — PAIN SCALES - GENERAL: PAINLEVEL: 8

## 2023-11-02 NOTE — PROGRESS NOTES
Patient here today for follow up visit, alone. She is still having incisional pain post op, unrelieved by anything. No new or worsening symptoms.     Medications and allergies reviewed with patient.

## 2023-11-02 NOTE — PATIENT INSTRUCTIONS
CT and follow up with Dr. Bsihop in 6 months.     Referral was placed to [facility].     Please feel free to call with any questions and concerns at [phone].

## 2023-12-13 ENCOUNTER — APPOINTMENT (OUTPATIENT)
Dept: PRIMARY CARE | Facility: CLINIC | Age: 73
End: 2023-12-13
Payer: MEDICARE

## 2023-12-27 ENCOUNTER — APPOINTMENT (OUTPATIENT)
Dept: PREADMISSION TESTING | Facility: HOSPITAL | Age: 73
End: 2023-12-27
Payer: MEDICARE

## 2024-01-25 ENCOUNTER — HOSPITAL ENCOUNTER (OUTPATIENT)
Dept: RADIOLOGY | Facility: CLINIC | Age: 74
Discharge: HOME | End: 2024-01-25
Payer: MEDICARE

## 2024-01-25 VITALS — BODY MASS INDEX: 31.37 KG/M2 | HEIGHT: 68 IN | WEIGHT: 207 LBS

## 2024-01-25 DIAGNOSIS — Z12.31 ENCOUNTER FOR SCREENING MAMMOGRAM FOR MALIGNANT NEOPLASM OF BREAST: ICD-10-CM

## 2024-01-25 PROCEDURE — 77067 SCR MAMMO BI INCL CAD: CPT

## 2024-03-11 ENCOUNTER — TELEPHONE (OUTPATIENT)
Dept: PRIMARY CARE | Facility: CLINIC | Age: 74
End: 2024-03-11
Payer: MEDICARE

## 2024-03-11 DIAGNOSIS — I10 ESSENTIAL (PRIMARY) HYPERTENSION: ICD-10-CM

## 2024-03-12 RX ORDER — LISINOPRIL 20 MG/1
20 TABLET ORAL DAILY
Qty: 30 TABLET | Refills: 2 | Status: SHIPPED | OUTPATIENT
Start: 2024-03-12

## 2024-03-13 ENCOUNTER — TELEPHONE (OUTPATIENT)
Dept: PRIMARY CARE | Facility: CLINIC | Age: 74
End: 2024-03-13
Payer: MEDICARE

## 2024-03-13 NOTE — TELEPHONE ENCOUNTER
PT was asked to schedule a HTN check. PT stated she checks her BP at home and she doesn't want to pay a copay just for that. She then asked if she could come this moment to meet with him, advised her when his next appointment was for the day and she stated she doesn't drive on [address] that late. (3:30 pm). Please advise.

## 2024-03-20 ENCOUNTER — TELEPHONE (OUTPATIENT)
Dept: PRIMARY CARE | Facility: CLINIC | Age: 74
End: 2024-03-20

## 2024-03-20 ENCOUNTER — OFFICE VISIT (OUTPATIENT)
Dept: PRIMARY CARE | Facility: CLINIC | Age: 74
End: 2024-03-20
Payer: MEDICARE

## 2024-03-20 VITALS
WEIGHT: 212 LBS | BODY MASS INDEX: 32.23 KG/M2 | HEART RATE: 67 BPM | SYSTOLIC BLOOD PRESSURE: 124 MMHG | OXYGEN SATURATION: 97 % | DIASTOLIC BLOOD PRESSURE: 70 MMHG

## 2024-03-20 DIAGNOSIS — Z00.00 ANNUAL PHYSICAL EXAM: ICD-10-CM

## 2024-03-20 DIAGNOSIS — G89.29 CHRONIC MIDLINE LOW BACK PAIN WITHOUT SCIATICA: ICD-10-CM

## 2024-03-20 DIAGNOSIS — E66.09 CLASS 1 OBESITY DUE TO EXCESS CALORIES WITH SERIOUS COMORBIDITY AND BODY MASS INDEX (BMI) OF 32.0 TO 32.9 IN ADULT: ICD-10-CM

## 2024-03-20 DIAGNOSIS — C34.31 MALIGNANT NEOPLASM OF LOWER LOBE OF RIGHT LUNG (MULTI): ICD-10-CM

## 2024-03-20 DIAGNOSIS — M54.50 CHRONIC MIDLINE LOW BACK PAIN WITHOUT SCIATICA: ICD-10-CM

## 2024-03-20 DIAGNOSIS — I10 ESSENTIAL (PRIMARY) HYPERTENSION: ICD-10-CM

## 2024-03-20 DIAGNOSIS — I10 ESSENTIAL (PRIMARY) HYPERTENSION: Primary | ICD-10-CM

## 2024-03-20 PROCEDURE — 1157F ADVNC CARE PLAN IN RCRD: CPT | Performed by: FAMILY MEDICINE

## 2024-03-20 PROCEDURE — 3074F SYST BP LT 130 MM HG: CPT | Performed by: FAMILY MEDICINE

## 2024-03-20 PROCEDURE — 1126F AMNT PAIN NOTED NONE PRSNT: CPT | Performed by: FAMILY MEDICINE

## 2024-03-20 PROCEDURE — 3008F BODY MASS INDEX DOCD: CPT | Performed by: FAMILY MEDICINE

## 2024-03-20 PROCEDURE — 99214 OFFICE O/P EST MOD 30 MIN: CPT | Performed by: FAMILY MEDICINE

## 2024-03-20 PROCEDURE — 1036F TOBACCO NON-USER: CPT | Performed by: FAMILY MEDICINE

## 2024-03-20 PROCEDURE — 3078F DIAST BP <80 MM HG: CPT | Performed by: FAMILY MEDICINE

## 2024-03-20 PROCEDURE — 1159F MED LIST DOCD IN RCRD: CPT | Performed by: FAMILY MEDICINE

## 2024-03-20 RX ORDER — TORSEMIDE 10 MG/1
10 TABLET ORAL DAILY
Qty: 90 TABLET | Refills: 3 | Status: SHIPPED | OUTPATIENT
Start: 2024-03-20

## 2024-03-20 ASSESSMENT — PAIN SCALES - GENERAL: PAINLEVEL: 0-NO PAIN

## 2024-03-20 NOTE — PROGRESS NOTES
Subjective   Patient ID: Lorraine Iniguez is a 73 y.o. female who presents for Hypertension.    HPI here for blood pressure check.  Patient is currently on lisinopril 20 mg daily.  Blood pressure is quite stable.  Patient has significant rheumatoid disease.  She sees rheumatology.  She is on Orencia injections.  She is also on methotrexate.  She is also on multiple vitamins and co-Q10.  She takes gabapentin 300 mg 3 times daily for her discomfort.    She has significant back pain and is status post multiple surgeries, the most recent 1 having been ineffective.  Patient has a history of lung cancer and is status post partial lobectomy.  She has follow-up with oncology routinely.  Review of Systems  Constitutional: She is positive for weight gain that is unintended.  Patient is negative for fever, fatigue.  HEENT: Patient is negative change in vision, hearing, swallow.  Cardio: Patient is negative for chest pain, lower extremity edema.  Pulmonary: Patient is negative for cough, shortness of breath.  Musculoskeletal: Patient is positive for back pain as well as rheumatoid arthritis.  Objective   /70   Pulse 67   Wt 96.2 kg (212 lb)   SpO2 97%   BMI 32.23 kg/m²     Physical Exam    Assessment/Plan   Problem List Items Addressed This Visit             ICD-10-CM    Essential (primary) hypertension - Primary stable.  Continue on lisinopril.  Patient has been off of torsemide.  Will re-institute torsemide.  Patient will follow-up in about 6 months for CPE. I10    Relevant Medications    torsemide (Demadex) 10 mg tablet    Low back pain chronic. M54.50    Malignant neoplasm of lower lobe of right lung (CMS/HCC)   in remission.  Patient sees hematology/oncology. C34.31    Obesity   needs better control.  Will investigate to see if patient is a candidate for GLP-1 therapy.  She was approximately 20 pounds lighter about a year and a half ago. E66.9

## 2024-03-22 ENCOUNTER — APPOINTMENT (OUTPATIENT)
Dept: PRIMARY CARE | Facility: CLINIC | Age: 74
End: 2024-03-22
Payer: MEDICARE

## 2024-03-25 NOTE — TELEPHONE ENCOUNTER
----- Message from Dez Person MD sent at 3/22/2024  4:28 PM EDT -----  Regarding: FW: overweight  Please call pt to let her know that the injectables are not paid for on her insurance to treat obesity  ----- Message -----  From: Oneyda Ferrara [facility]  Sent: 3/22/2024   8:54 AM EDT  To: Dez Person MD  Subject: RE: overweight                                   Unfortunately Medicare will only pay for GLP-1 when there is a dx of T2DM      ----- Message -----  From: Dez Person MD  Sent: 3/20/2024   2:44 PM EDT  To: Oneyda Ferrara [facility]  Subject: overweight                                       Pt is interested in GLP1 for weight loss.  NOT a diabetic but has severe lumbar Dz with Mx surgeries.  And has lung Ca, HTN, HLD.

## 2024-04-10 ENCOUNTER — APPOINTMENT (OUTPATIENT)
Dept: PRIMARY CARE | Facility: CLINIC | Age: 74
End: 2024-04-10
Payer: MEDICARE

## 2024-04-18 ENCOUNTER — TELEPHONE (OUTPATIENT)
Dept: PRIMARY CARE | Facility: CLINIC | Age: 74
End: 2024-04-18
Payer: MEDICARE

## 2024-04-18 DIAGNOSIS — R11.0 NAUSEA: Primary | ICD-10-CM

## 2024-04-18 RX ORDER — ONDANSETRON 4 MG/1
4 TABLET, ORALLY DISINTEGRATING ORAL EVERY 8 HOURS PRN
Qty: 20 TABLET | Refills: 0 | Status: SHIPPED | OUTPATIENT
Start: 2024-04-18 | End: 2024-04-25

## 2024-04-18 NOTE — TELEPHONE ENCOUNTER
Spoke to patient and she is aware Rx went to pharmacy.   She was advised and understands to drink fluids to keep hydrated.  She knows if symptoms worsen to call office or go to ED.

## 2024-04-18 NOTE — TELEPHONE ENCOUNTER
Patient calling to see if a prescription for nausea/ vomiting can be called in to [facility] in [place] off [address]. This started yesterday 04/17, she can not keep anything down. Patient states she can not come in today. She has tried to get up and move around but then gets dizzy.

## 2024-05-01 ENCOUNTER — HOSPITAL ENCOUNTER (OUTPATIENT)
Dept: RADIOLOGY | Facility: CLINIC | Age: 74
Discharge: HOME | End: 2024-05-01
Payer: MEDICARE

## 2024-05-01 DIAGNOSIS — C34.31 MALIGNANT NEOPLASM OF LOWER LOBE OF RIGHT LUNG (MULTI): ICD-10-CM

## 2024-05-01 PROCEDURE — 71250 CT THORAX DX C-: CPT | Performed by: STUDENT IN AN ORGANIZED HEALTH CARE EDUCATION/TRAINING PROGRAM

## 2024-05-01 PROCEDURE — 71250 CT THORAX DX C-: CPT

## 2024-05-01 NOTE — PROGRESS NOTES
Subjective:   Patient Name: Lorraine Iniguez    : 1950     MRN: [id number]     Age: 74 y.o.     Gender: female  Referring Provider: ZENA  Pt number: [phone]    CC: Management of newly diagnosed working stage IA3  ([id number]/1c ) squamous cell lung cancer. PDL-1 2%. NGS not done due to insufficient tissue.  22: RLL wedge resection: NSCLC with predominantly squamous cell and components of glandular findings. Stage IA2 ([id number]), PDL-1 <1%. NGS KALYANI, TP53 p.[id number].      Presenting History (10/11/2022):  Ms Iniguez is a 72-year-old F, active smoker (0.5 pack per day for ~ 60 years, 30 py smoking) with PMHx of RA  (dx in late 30s, currently controlled with MTX and Abatacept) who is referred here for the management of newly diagnosed likely metastatic squamous cell lung cancer. She has been having long-standing  long nodules that potentially related to her RA, and it has been followed with serial scans. She had LLL nodule biopsy in 10/2017 which  showed necrotic tissue with granulation tissue proliferation, fibrosis and chronic active inflammation. She had another RLL CT guided biopsy in 2020 which again negative for malignancy. On the LDCT screening scan on 2022, she was found to have  multiple bilateral lung nodules. She underwent PET/CT on 2022 which showed multiple new pulmonary nodules are seen in bilateral  lungs suspicious for neoplasm. Metastatic disease cannot be excluded. No axillary, hilar or mediastinal adenopathy. Stable R hepatic lobe cyst measuring 14mm without radiotracer activity. Unchanged renal cyst. She underwent CT-guided lung biopsy of RLL  nodule on 09/15/2022 with pathology revealing invasive squamous cell carcinoma. IHC positive for p40, CK7, CK5/6 and some staining for TTF-1. Negative for synaptophysin, CK20, napsin A and chromogranin.   She presented to clinic today accompanied by her daughter.  She is overall doing well.  She endorses dyspnea on exertion if  walking for a long distance.  She also uses a cane if she needs to walk outside.  She has mild cough which was stable.  She denies  any hemoptysis, fever, chills, nausea, vomiting, diarrhea, headache, or visual change.  She does have chronic joint pain which varies.  She endorses left knee pain and R sided chest pain since the biopsy, which is well controlled by over-the-counter pain  medication.     Treatment Summary:   11/28/2022: RLL wedge resection (Dr. Shi)    Interval History (5/2/2024):  Patient presents for a follow up visit. She unfortunately continues to have significant back pain and knee pain (R sided). Back pain has been worse recently and she has been seen back surgeon at [facility]. She has upcoming MRI scheduled. She otherwise feels fine - no SOB or cough. Eating and voiding well. No n/v/d/c. Continues to follow with her pain management doctor for pain control.       ROS:  Patient denies the below review of systems, except for changes as noted in the interval history.    General:  Fatigue, anorexia, fevers, sweats, chills, heat/cold intolerance, weight changes.  HEENT:  Icterus, congestion, sore throat, rhinorrhea, taste change, voice changes.  Neurology:  Headache, dizziness, vision changes, hearing changes, other motor/sensory loss, gait instability, neuropathies.  Pulmonology:  Cough, wheezing, hemoptysis, dyspnea at rest, dyspnea on exertion, pleuritic chest pain.  Cardiology:  Chest pain, palpitations, orthopnea, paroxysmal nocturnal dyspnea.  Gastroenterology:  Nausea, vomiting, reflux symptoms, abdominal pain, constipation, diarrhea, melena, bright red blood per rectum.  Genitourinary:  Dysuria, polyuria, urine color change, urine smell change, hematuria.   Musculoskeletal:  Arthralgias, myalgias, joint swelling, focal weakness.  Skin:  Rash, pruritis, jaundice, petechiae, nail changes, skin nodules/growth.  Psychology:  Anxiety, depression, suicidal ideation, homicidal ideation.  Heme:  Easy  bleeding or bruising.  Others:   All other systems reviewed and are negative.    Medical History:   No past medical history on file.    Surgical History:   Past Surgical History:   Procedure Laterality Date    BREAST BIOPSY Left     benign    CT GUIDED IMAGING FOR NEEDLE PLACEMENT  10/12/2017    CT GUIDED IMAGING FOR NEEDLE PLACEMENT [facility]    CT GUIDED IMAGING FOR NEEDLE PLACEMENT  07/10/2020    CT GUIDED IMAGING FOR NEEDLE PLACEMENT [facility]    CT GUIDED PERCUTANEOUS BIOPSY LUNG  11/30/2022    CT GUIDED PERCUTANEOUS BIOPSY LUNG [facility]       Family History:  No family history on file.    Allergies:  No Known Allergies      Meds (Current):    Current Outpatient Medications:     ascorbic acid (Vitamin C) 1,000 mg tablet, Take 1 tablet (1,000 mg) by mouth once daily.  for 30 day(s), Disp: , Rfl:     BABY ASPIRIN ORAL, Baby Aspirin, Disp: , Rfl:     calcium carbonate-vitamin D3 (Caltrate with Vitamin D3) 600 mg-20 mcg (800 unit) tablet, Take 1 tablet by mouth 2 times a day., Disp: , Rfl:     cetirizine (ZyrTEC) 10 mg tablet, Take 1 tablet (10 mg) by mouth once daily., Disp: , Rfl:     cholecalciferol (Vitamin D3) 50 mcg (2,000 unit) capsule, Take 1 capsule (50 mcg) by mouth early in the morning.., Disp: , Rfl:     coenzyme Q-10 (Co Q-10) 200 mg capsule, Take 1 capsule (200 mg) by mouth once daily., Disp: , Rfl:     cyanocobalamin (Vitamin B-12) 1,000 mcg tablet, Take 2 tablets (2,000 mcg) by mouth once daily., Disp: , Rfl:     denosumab (PROLIA SUBQ), Inject 60 mg under the skin. q6 mo, Disp: , Rfl:     eszopiclone (Lunesta) 3 mg tablet, Take 1 tablet (3 mg) by mouth once daily at bedtime., Disp: , Rfl:     folic acid (Folvite) 1 mg tablet, Take 2 tablets (2 mg) by mouth once daily., Disp: , Rfl:     gabapentin (Neurontin) 300 mg capsule, Take 1 capsule (300 mg) by mouth 3 times a day., Disp: , Rfl:     glucosamine/chondr hayes A sod (OSTEO BI-FLEX ORAL), 0 Refill(s), Type:  Maintenance, Disp: , Rfl:     hydroxychloroquine (Plaquenil) 200 mg tablet, Take 1 tablet (200 mg) by mouth once daily., Disp: , Rfl:     Lactobacil 2/Bifido 11/S.therm (HIGH POTENCY PROBIOTIC ORAL), Take by mouth once daily., Disp: , Rfl:     lisinopril 20 mg tablet, TAKE 1 TABLET BY MOUTH EVERY DAY, Disp: 30 tablet, Rfl: 2    magnesium gluconate (Magonate) 27.5 mg magne- sium (500 mg) tablet, Take 500 mg by mouth 2 times a day., Disp: , Rfl:     NON FORMULARY, Elderberry 500 MG as directed Orally, Disp: , Rfl:     omega 3-dha-epa-fish oil (Fish OiL) 1,200 (144-216) mg capsule, Take 1 capsule (1,200 mg) by mouth once daily., Disp: , Rfl:     Orencia ClickJect 125 mg/mL auto-injector auto-injection, , Disp: , Rfl:     POTASSIUM ORAL, Take 99 mg by mouth once daily. for 30 day(s), Disp: , Rfl:     Rasuvo, PF, 15 mg/0.3 mL auto-injector, , Disp: , Rfl:     rosuvastatin (Crestor) 20 mg tablet, Take 1 tablet (20 mg) by mouth once daily., Disp: , Rfl:     torsemide (Demadex) 10 mg tablet, Take 1 tablet (10 mg) by mouth once daily., Disp: 90 tablet, Rfl: 3    traZODone (Desyrel) 300 mg tablet, Take 0.5 tablets (150 mg) by mouth once daily at bedtime.  for 30 day(s), Disp: , Rfl:     vitamin B complex (SUPER B-50 COMPLEX ORAL), Super B Complex, Disp: , Rfl:     zinc gluconate 50 mg tablet, Take 1 tablet (50 mg) by mouth once daily.  for 30 day(s), Disp: , Rfl:       Performance Status (ECOG): 1         ECOG Definition  0 Fully active; no performance restrictions.  1 Strenuous physical activity restricted; fully ambulatory and able to carry out light work.  2 Capable of all self-care but unable to carry out any work activities. Up and about >50% of waking hours.  3 Capable of only limited self-care; confined to bed or chair >50% of waking hours.  4 Completely disabled; cannot carry out any self-care; totally confined to bed or chair.      Exam:  BP (!) 165/91 (BP Location: Left arm, Patient Position: Sitting, BP Cuff Size:  "Adult long)   Pulse 91   Temp 37.2 °C (98.9 °F) (Temporal)   Resp 18   Ht (S) 1.71 m (5' 7.32\") Comment: with shoes on  Wt 92.4 kg (203 lb 13 oz) Comment: with shoes on  SpO2 95%   BMI 31.62 kg/m²   General: Well appearing, no acute distress  Neurology: Alert and oriented x3, CN II-XII grossly intact  Psychology: Affect appropriate  Eyes: PERRL, EOMI  ENT: Mucus membranes moist and intact, no ulcers  Lymphatics: No palpable adenopathy  Neck: Supple, no masses  Respiratory: Lungs clear bilaterally, no wheezes, no rales, no rhonchi  Cardiovascular: Normal rate and rhythm, no murmur  Abdomen: Soft, non-tender, non-distended, normoactive, no hepatosplenomegaly, no ascites  Musculoskeletal: Normal gait and stance  Extremities: No clubbing, no cyanosis, no edema  Skin: No rash  Genitourinary: Deferred  Rectal: Deferred   Pelvic: Deferred  Breasts: Deferred    Labs:                Assessment/Plan:   74 y.o. female RA who is referred here for the management of newly diagnosed  working stage IV metastatic squamous cell lung cancer. PDL-1 2%; NGS: insufficient tissue. She had RLL wedge resection with Dr. Shi  on 11/28/2022, [id number]. Doing well so far.      Plan:    The patient's records and imaging have been reviewed in detail.  I have discussed with the patient the natural history of their disease  at length.  The following has also been discussed with the patient:     # Working stage IA2 ([id number])) squamous cell lung cancer, PDL-1 2%. She underwent  RLL wedge resection with Dr. Shi on 11/28/22 with pathology revealing invasive NSCLC predominantly squamous cell features and focal glandular components tumor measure 1.6cm. All LN including 7, 8 10R were negative (0/10-11). No LVI or VPI. Margins  were negative. PDL-1 <1%. NGS KALYANI, TP53 p.W291Vwb*18. We discussed that given the small size of her tumor, no adjuvant chemotherapy is indicated in this setting. We will continue with surveillance scan every 6 months " for 2-3 years and annually.                 - Interval Imaging: 10/12/2023: CT chest (-) 11.  No lymphadenopathy in the chest. Previously noted subcarinal lymph node less conspicuous 2. Stable appearing pulmonary nodules 3. Chronic appearing interstitial changes stable. 4/2024 scan pending final reads.      # RA: follows with Dr. Betsy Prabhakar [phone].      # Clinical Trials:  None currently.      # Access: Peripheral veins.     # Pain: Pain due to RA. Had recent flare. Currently on tramadol as needed. Pending orthopedic appt. Will refer to integrative medicine for uncontrolled back pain and knee pain.      # Bone Health: No signs of bony disease.     # Psychosocial: Coping well, no acute issues.  Good support from family & friends.       # GI/CINV: No acute issues.  Eating and voiding well.       # Smoking: Former smoker, quitted in 2022     # Fertility: N/A.  Oncofertility support available as needed.       # Heme/ESAs: N/A.     # GOC: Patient is aware of curative intent goals of care.     # Language: English.     # Dispo: RTC in 6 months with scans prior

## 2024-05-02 ENCOUNTER — OFFICE VISIT (OUTPATIENT)
Dept: HEMATOLOGY/ONCOLOGY | Facility: CLINIC | Age: 74
End: 2024-05-02
Payer: MEDICARE

## 2024-05-02 VITALS
HEART RATE: 91 BPM | HEIGHT: 67 IN | OXYGEN SATURATION: 95 % | SYSTOLIC BLOOD PRESSURE: 165 MMHG | DIASTOLIC BLOOD PRESSURE: 91 MMHG | BODY MASS INDEX: 31.99 KG/M2 | WEIGHT: 203.81 LBS | TEMPERATURE: 98.9 F | RESPIRATION RATE: 18 BRPM

## 2024-05-02 DIAGNOSIS — C34.31 MALIGNANT NEOPLASM OF LOWER LOBE OF RIGHT LUNG (MULTI): ICD-10-CM

## 2024-05-02 PROCEDURE — 99213 OFFICE O/P EST LOW 20 MIN: CPT | Performed by: STUDENT IN AN ORGANIZED HEALTH CARE EDUCATION/TRAINING PROGRAM

## 2024-05-02 PROCEDURE — 1159F MED LIST DOCD IN RCRD: CPT | Performed by: STUDENT IN AN ORGANIZED HEALTH CARE EDUCATION/TRAINING PROGRAM

## 2024-05-02 PROCEDURE — 1157F ADVNC CARE PLAN IN RCRD: CPT | Performed by: STUDENT IN AN ORGANIZED HEALTH CARE EDUCATION/TRAINING PROGRAM

## 2024-05-02 PROCEDURE — 3077F SYST BP >= 140 MM HG: CPT | Performed by: STUDENT IN AN ORGANIZED HEALTH CARE EDUCATION/TRAINING PROGRAM

## 2024-05-02 PROCEDURE — 3080F DIAST BP >= 90 MM HG: CPT | Performed by: STUDENT IN AN ORGANIZED HEALTH CARE EDUCATION/TRAINING PROGRAM

## 2024-05-02 PROCEDURE — 1125F AMNT PAIN NOTED PAIN PRSNT: CPT | Performed by: STUDENT IN AN ORGANIZED HEALTH CARE EDUCATION/TRAINING PROGRAM

## 2024-05-02 PROCEDURE — 3008F BODY MASS INDEX DOCD: CPT | Performed by: STUDENT IN AN ORGANIZED HEALTH CARE EDUCATION/TRAINING PROGRAM

## 2024-05-02 RX ORDER — LIDOCAINE 50 MG/G
PATCH TOPICAL
COMMUNITY
Start: 2024-03-21 | End: 2024-05-02 | Stop reason: ALTCHOICE

## 2024-05-02 ASSESSMENT — COLUMBIA-SUICIDE SEVERITY RATING SCALE - C-SSRS
6. HAVE YOU EVER DONE ANYTHING, STARTED TO DO ANYTHING, OR PREPARED TO DO ANYTHING TO END YOUR LIFE?: NO
2. HAVE YOU ACTUALLY HAD ANY THOUGHTS OF KILLING YOURSELF?: NO
1. IN THE PAST MONTH, HAVE YOU WISHED YOU WERE DEAD OR WISHED YOU COULD GO TO SLEEP AND NOT WAKE UP?: NO

## 2024-05-02 ASSESSMENT — PAIN SCALES - GENERAL: PAINLEVEL: 8

## 2024-05-02 ASSESSMENT — ENCOUNTER SYMPTOMS
DEPRESSION: 0
OCCASIONAL FEELINGS OF UNSTEADINESS: 0

## 2024-05-14 ENCOUNTER — TELEPHONE (OUTPATIENT)
Dept: HEMATOLOGY/ONCOLOGY | Facility: CLINIC | Age: 74
End: 2024-05-14
Payer: MEDICARE

## 2024-08-16 DIAGNOSIS — I10 ESSENTIAL (PRIMARY) HYPERTENSION: ICD-10-CM

## 2024-08-16 RX ORDER — LISINOPRIL 20 MG/1
20 TABLET ORAL DAILY
Qty: 90 TABLET | Refills: 3 | Status: SHIPPED | OUTPATIENT
Start: 2024-08-16

## 2024-08-16 NOTE — TELEPHONE ENCOUNTER
Patient wants to get a refill on Lisinopril 20 mg, 90 day supply.  Pharmacy is [facility] on [address]    Patient's number:  [phone]

## 2024-08-28 DIAGNOSIS — E78.1 PURE HYPERGLYCERIDEMIA: ICD-10-CM

## 2024-08-28 RX ORDER — ROSUVASTATIN CALCIUM 20 MG/1
20 TABLET, COATED ORAL DAILY
Qty: 90 TABLET | Refills: 0 | Status: SHIPPED | OUTPATIENT
Start: 2024-08-28

## 2024-09-03 ENCOUNTER — APPOINTMENT (OUTPATIENT)
Dept: PRIMARY CARE | Facility: CLINIC | Age: 74
End: 2024-09-03
Payer: MEDICARE

## 2024-09-16 ENCOUNTER — LAB (OUTPATIENT)
Dept: LAB | Facility: LAB | Age: 74
End: 2024-09-16
Payer: MEDICARE

## 2024-09-16 DIAGNOSIS — I10 ESSENTIAL (PRIMARY) HYPERTENSION: ICD-10-CM

## 2024-09-16 DIAGNOSIS — Z00.00 ANNUAL PHYSICAL EXAM: ICD-10-CM

## 2024-09-16 LAB
ALBUMIN SERPL-MCNC: 4.2 G/DL (ref 3.5–5)
ALP BLD-CCNC: 54 U/L (ref 35–125)
ALT SERPL-CCNC: 20 U/L (ref 5–40)
ANION GAP SERPL CALC-SCNC: 11 MMOL/L
AST SERPL-CCNC: 23 U/L (ref 5–40)
BASOPHILS # BLD AUTO: 0.03 X10*3/UL (ref 0–0.1)
BASOPHILS NFR BLD AUTO: 0.5 %
BILIRUB SERPL-MCNC: 0.4 MG/DL (ref 0.1–1.2)
BUN SERPL-MCNC: 18 MG/DL (ref 8–25)
CALCIUM SERPL-MCNC: 9.4 MG/DL (ref 8.5–10.4)
CHLORIDE SERPL-SCNC: 106 MMOL/L (ref 97–107)
CHOLEST SERPL-MCNC: 188 MG/DL (ref 133–200)
CHOLEST/HDLC SERPL: 2.4 {RATIO}
CO2 SERPL-SCNC: 25 MMOL/L (ref 24–31)
CREAT SERPL-MCNC: 1 MG/DL (ref 0.4–1.6)
EGFRCR SERPLBLD CKD-EPI 2021: 59 ML/MIN/1.73M*2
EOSINOPHIL # BLD AUTO: 0.26 X10*3/UL (ref 0–0.4)
EOSINOPHIL NFR BLD AUTO: 4.6 %
ERYTHROCYTE [DISTWIDTH] IN BLOOD BY AUTOMATED COUNT: 13.6 % (ref 11.5–14.5)
GLUCOSE SERPL-MCNC: 96 MG/DL (ref 65–99)
HCT VFR BLD AUTO: 43.8 % (ref 36–46)
HDLC SERPL-MCNC: 78 MG/DL
HGB BLD-MCNC: 14.1 G/DL (ref 12–16)
IMM GRANULOCYTES # BLD AUTO: 0.01 X10*3/UL (ref 0–0.5)
IMM GRANULOCYTES NFR BLD AUTO: 0.2 % (ref 0–0.9)
LDLC SERPL CALC-MCNC: 95 MG/DL (ref 65–130)
LYMPHOCYTES # BLD AUTO: 1.92 X10*3/UL (ref 0.8–3)
LYMPHOCYTES NFR BLD AUTO: 34.1 %
MCH RBC QN AUTO: 31.6 PG (ref 26–34)
MCHC RBC AUTO-ENTMCNC: 32.2 G/DL (ref 32–36)
MCV RBC AUTO: 98 FL (ref 80–100)
MONOCYTES # BLD AUTO: 0.38 X10*3/UL (ref 0.05–0.8)
MONOCYTES NFR BLD AUTO: 6.7 %
NEUTROPHILS # BLD AUTO: 3.03 X10*3/UL (ref 1.6–5.5)
NEUTROPHILS NFR BLD AUTO: 53.9 %
NRBC BLD-RTO: 0 /100 WBCS (ref 0–0)
PLATELET # BLD AUTO: 212 X10*3/UL (ref 150–450)
POTASSIUM SERPL-SCNC: 4.1 MMOL/L (ref 3.4–5.1)
PROT SERPL-MCNC: 6.6 G/DL (ref 5.9–7.9)
RBC # BLD AUTO: 4.46 X10*6/UL (ref 4–5.2)
SODIUM SERPL-SCNC: 142 MMOL/L (ref 133–145)
TRIGL SERPL-MCNC: 76 MG/DL (ref 40–150)
WBC # BLD AUTO: 5.6 X10*3/UL (ref 4.4–11.3)

## 2024-09-16 PROCEDURE — 80053 COMPREHEN METABOLIC PANEL: CPT

## 2024-09-16 PROCEDURE — 85025 COMPLETE CBC W/AUTO DIFF WBC: CPT

## 2024-09-16 PROCEDURE — 36415 COLL VENOUS BLD VENIPUNCTURE: CPT

## 2024-09-16 PROCEDURE — 80061 LIPID PANEL: CPT

## 2024-09-24 ENCOUNTER — APPOINTMENT (OUTPATIENT)
Dept: PRIMARY CARE | Facility: CLINIC | Age: 74
End: 2024-09-24
Payer: MEDICARE

## 2024-09-24 VITALS
DIASTOLIC BLOOD PRESSURE: 74 MMHG | BODY MASS INDEX: 32.18 KG/M2 | HEIGHT: 67 IN | SYSTOLIC BLOOD PRESSURE: 144 MMHG | OXYGEN SATURATION: 96 % | TEMPERATURE: 96.8 F | HEART RATE: 64 BPM | WEIGHT: 205 LBS

## 2024-09-24 DIAGNOSIS — Z12.31 ENCOUNTER FOR SCREENING MAMMOGRAM FOR MALIGNANT NEOPLASM OF BREAST: ICD-10-CM

## 2024-09-24 DIAGNOSIS — C34.31 MALIGNANT NEOPLASM OF LOWER LOBE OF RIGHT LUNG (MULTI): ICD-10-CM

## 2024-09-24 DIAGNOSIS — Z23 ENCOUNTER FOR IMMUNIZATION: ICD-10-CM

## 2024-09-24 DIAGNOSIS — I10 ESSENTIAL (PRIMARY) HYPERTENSION: ICD-10-CM

## 2024-09-24 DIAGNOSIS — I25.10 CORONARY ARTERY DISEASE INVOLVING NATIVE HEART, UNSPECIFIED VESSEL OR LESION TYPE, UNSPECIFIED WHETHER ANGINA PRESENT: ICD-10-CM

## 2024-09-24 DIAGNOSIS — Z12.11 COLON CANCER SCREENING: ICD-10-CM

## 2024-09-24 DIAGNOSIS — Z00.00 ANNUAL PHYSICAL EXAM: Primary | ICD-10-CM

## 2024-09-24 LAB
POC APPEARANCE, URINE: CLEAR
POC BILIRUBIN, URINE: NEGATIVE
POC BLOOD, URINE: NEGATIVE
POC COLOR, URINE: YELLOW
POC GLUCOSE, URINE: NEGATIVE MG/DL
POC KETONES, URINE: NEGATIVE MG/DL
POC LEUKOCYTES, URINE: ABNORMAL
POC NITRITE,URINE: NEGATIVE
POC PH, URINE: 6 PH
POC PROTEIN, URINE: NEGATIVE MG/DL
POC SPECIFIC GRAVITY, URINE: 1.02
POC UROBILINOGEN, URINE: 0.2 EU/DL

## 2024-09-24 PROCEDURE — 1157F ADVNC CARE PLAN IN RCRD: CPT | Performed by: FAMILY MEDICINE

## 2024-09-24 PROCEDURE — G0008 ADMIN INFLUENZA VIRUS VAC: HCPCS | Performed by: FAMILY MEDICINE

## 2024-09-24 PROCEDURE — 3008F BODY MASS INDEX DOCD: CPT | Performed by: FAMILY MEDICINE

## 2024-09-24 PROCEDURE — 1036F TOBACCO NON-USER: CPT | Performed by: FAMILY MEDICINE

## 2024-09-24 PROCEDURE — 1170F FXNL STATUS ASSESSED: CPT | Performed by: FAMILY MEDICINE

## 2024-09-24 PROCEDURE — 1159F MED LIST DOCD IN RCRD: CPT | Performed by: FAMILY MEDICINE

## 2024-09-24 PROCEDURE — 1126F AMNT PAIN NOTED NONE PRSNT: CPT | Performed by: FAMILY MEDICINE

## 2024-09-24 PROCEDURE — 3078F DIAST BP <80 MM HG: CPT | Performed by: FAMILY MEDICINE

## 2024-09-24 PROCEDURE — 90662 IIV NO PRSV INCREASED AG IM: CPT | Performed by: FAMILY MEDICINE

## 2024-09-24 PROCEDURE — G0439 PPPS, SUBSEQ VISIT: HCPCS | Performed by: FAMILY MEDICINE

## 2024-09-24 PROCEDURE — 3077F SYST BP >= 140 MM HG: CPT | Performed by: FAMILY MEDICINE

## 2024-09-24 PROCEDURE — 81003 URINALYSIS AUTO W/O SCOPE: CPT | Performed by: FAMILY MEDICINE

## 2024-09-24 ASSESSMENT — ACTIVITIES OF DAILY LIVING (ADL)
MANAGING_FINANCES: INDEPENDENT
DOING_HOUSEWORK: INDEPENDENT
BATHING: INDEPENDENT
GROCERY_SHOPPING: INDEPENDENT
TAKING_MEDICATION: INDEPENDENT
DRESSING: INDEPENDENT

## 2024-09-24 ASSESSMENT — PAIN SCALES - GENERAL: PAINLEVEL: 0-NO PAIN

## 2024-09-24 ASSESSMENT — ENCOUNTER SYMPTOMS
OCCASIONAL FEELINGS OF UNSTEADINESS: 1
DEPRESSION: 0
LOSS OF SENSATION IN FEET: 0

## 2024-09-24 NOTE — PROGRESS NOTES
Subjective   Patient ID: Lorraine Iniguez is a 74 y.o. female who presents for Medicare Annual Wellness Visit Subsequent (EKG-  says she sees cardiologist Dr. Parks/Colonoscopy   2011  declines - discuss cologuard/PV 13  10/2019   Pneumo 23 2018/Mammogram-  1/2024/Bone Density  has order from rheumatologist/Flu vaccines-   today).    HPI   LORRAINE is seen for for her comprehensive physical exam. PMH, PSH, family history and social history were reviewed and updated; all other diagnoses are reviewed as part of the PMFSH, ROS and Problem list.   Patient does not see Gynecology, and she does not desire a gynecology examination.  She has Rheumatoid arthritis. Pt sees Rheum and is stable.  Tobacco Abuse. Patient quit tobacco about a year ago after 50 years of tobacco use.  Pt is s/p back surgery and still in pain. Patient sees pain management. .  Patient has lung cancer.  She is monitored by pulmonology. She is status post a right partial lobectomy.  Patient sees cardiology.  She most recently has been placed on rosuvastatin 20 milligrams daily.  Patient does not like to come to the doctor's office. She would like a prescription for antibiotic to be used when she gets sick . This is been done in the past for her.  .  Patient cannot recall her most recent tetanus shot.  She is unable to receive shingles immunization secondary to her rheumatologic condition.  Patient has been immunized against pneumonia x2.  Patient has been immunized against coronavirus x3.  Patient will receive a flu shot today.  Patient had a colonoscopy in 2011.  Patient quit tobacco in 2021 after more than 50 years of use.  Patient does not drink alcohol.  Review of Systems  Constitutional Symptoms:  She is negative for fever, night sweats, hot flashes, weight loss, Weight loss due to diet, weight gain due to diet, unexpected weight gain, loss of appetite, increased appetite, headaches, fatigue, abnormal activity level, Sleep Disturbance, Recent  Illness.   Eyes:  She is negative for blindness, blind spots, double vision, swelling, redness, pruritus, eye pain, discharge, dryness of eyes, tearing.   Ear, Nose, Mouth, Throat:  She is negative for hearing loss, wearing hearing aids, tinnitus, ear pain, ear drainage, dizziness, allergies, nasal congestion, rhinorrhea, nasal obstruction, post nasal drip, nose bleeds, teeth problems, wearing dentures, mouth sores, gum disease, dysphagia, hoarseness, sore throat, tinnitus, sleep apnea.   Cardiovascular:  She is negative for chest pain/pressure, radiation of pain, palpitations, shortness of breath, dyspnea on exertion, orthopnea, syncope, diaphoresis, cyanosis, edema.   Respiratory:  She is positive for tobacco use. She is negative for shortness of breath, dyspnea on exertion, coughing, sputum, hemoptysis, wheezing, snoring.   Breast:  She is negative for tenderness, masses, nipple discharge, gynecomastia.   Gastrointestinal:  She is negative for anorexia, indigestion, increased belching, food intolerance, use of antacids, nausea, vomiting, hematemesis, jaundice, abdominal pain, change in bowel habits, diarrhea, constipation, abnormal stools, hematochezia, melena, blood in stool, increased flatus, hemorrhoids.   Female Genitourinary:  She is negative for frequency, nocturia, dysuria, hematuria, recurrent UTIs, hot flashes, abnormal menses, pelvic pain, vaginal itching, vaginal discharge, Dyspareunia.   Musculoskeletal:  She is positive for joint pain - (has RA), joint swelling, stiffness - (has significant LBP, R knee pain), limitation of motion. She is negative for myalgias.   Integumentary:    She is negative for change in mole, skin trouble or rash, itching, dryness, loss of hair, hirsutism.   Neurological:  She is negative for headache, speech difficulty, numbness, tingling, weakness, paralysis, tremors, dizziness, syncope, balance problems.   Psychiatric:  She is negative for depression, moodiness, change in  "sleep pattern, disturbing thoughts or feelings, suicidal thoughts or attempts, anxiety, panic attacks, obsessive thoughts, compulsions, hyperactivity.   Endocrine:  She is negative for weight gain, heat or cold intolerance, excessive sweating, polydipsia, polyphagia, tremor .   Hematologic/Lymphatic:  She is negative for bruising, abnormal bleeding, nose bleeds, swollen glands,  Objective   /74 (BP Location: Left arm, Patient Position: Sitting, BP Cuff Size: Large adult)   Pulse 64   Temp 36 °C (96.8 °F)   Ht 1.702 m (5' 7\")   Wt 93 kg (205 lb)   SpO2 96%   BMI 32.11 kg/m²     Physical Exam  General Appearance: JOSI is in no acute distress. She is well nourished, well developed. The patient is awake and alert and appears stated age. JOSI is cooperative with exam. Patient did not undress for this exam.  Head:   JOSI's hair pattern is normal for patients age and The scalp is normal . The skull is normocephalic, atraumatic. The face is unremarkable with no facial droop. Palpation of the head reveals no tenderness or masses.  Eyes: PERRLA, EOMI, no scleral icterus.  Normal position and alignment. Eyebrows are normal.  Ears, Nose, Mouth, Throat:   EARS: External bilateral ears reveal normal helix, tragus and ear lobe.  Both canals are normal.  Tympanic membranes are pearly gray, normal landmarks, good light reflex.   MOUTH: Lips are normal with no lesion. Oral mucosa is moist.  Hard and soft palates are normal. Teeth are in good repair. Tongue reveals is normal. Tonsils are present with no lesions. Uvula is normal. Posterior pharynx without lesions.  Neck: Inspection of the neck reveals no masses or JVD.   Inspection reveals normal thyroid gland. Palpation shows normal thyroid gland. with No carotid bruit present.   Anterior cervical lymph node chains are unremarkable. Posterior chains are unremarkable.  Chest: Chest is symmetric. Lungs are clear to auscultation and percussion, no respiratory distress. " Breathing is normal.  Cardiovascular: Heart is RRR, normal S1, S2. No murmurs, rubs or gallops. PMI is normal in left 6th IC space midclavicular line. Femoral pulses are present and without bruits. DP pulses are present. Extremities: no edema, clubbing, cyanosis, or varicosities.  Breast:   Breasts: No masses, no discharge, no visual abnormalities.  Abdomen: Abdomen is soft, NT, ND. BS + 4 quadrants. No organomegaly or masses..  Genitourinary: Genital/Rectal exam is deferred.  Lymph Nodes: Palpation of the cervical area are within normal limit. Palpation of the axillary area are within normal limit. Palpation of the inguinal area are within normal limit.  Musculoskeletal: Gait is normal. Extremities: Full Range of motion and strength throughout.  Skin: She has erythematous patches without desquamation each approximately 2 centimeters in diameter on the sole of the right foot on the dorsum of the right foot and dorsum of the left foot. Skin has no bruising, rash, eruptions, or abnormal appearing lesions.     Neurological: Intact and non-focal. Cranial nerves II - XII are grossly intact. Motor exams reveals normal tone and strength , Sensation: normal to touch, position and vibration. DTR: are +2/4 and symmetric at the AJ and KJ and no Babinski.  Psychiatric: Patient has appropriate judgement. Patient has good insight. Proper orientation to person, place and time. Recent memory is intact. Remote memory is intact. Patient's mood is normal with good eye contact. Affect is appropriate.  Assessment/Plan   Problem List Items Addressed This Visit             ICD-10-CM    CAD (coronary artery disease)    chronic, stable. I25.10    Essential (primary) hypertension stable.  Continue on lisinopril 20 mg daily, torsemide 10 mg daily. I10    Relevant Orders    POCT UA Automated manually resulted (Completed)    Malignant neoplasm of lower lobe of right lung (Multi) chronic.  Patient sees oncology routinely. C34.31     Other Visit  Diagnoses         Codes    Annual physical exam    -  Primary normal but limited exam.  Z00.00    Colon cancer screening    needs workup.  Patient to receive information regarding Cologuard screening. Z12.11    Relevant Orders    Cologuard® colon cancer screening    Encounter for screening mammogram for malignant neoplasm of breast    no examination.  Patient states she had a mammogram done within the past year. Z12.31    Encounter for immunization    needs better control.  Patient to receive flu immunization today. Z23    Relevant Orders    Flu vaccine, trivalent, preservative free, HIGH-DOSE, age 65y+ (Fluzone)

## 2024-09-25 ENCOUNTER — TELEPHONE (OUTPATIENT)
Dept: PRIMARY CARE | Facility: CLINIC | Age: 74
End: 2024-09-25
Payer: MEDICARE

## 2024-09-25 NOTE — TELEPHONE ENCOUNTER
Pt is reviewing her AVS and med list is wrong. The trazodone says 150 mg , pt takes 300 mg . Fish oil is 2 tabs daily . Please update

## 2024-09-26 DIAGNOSIS — J06.9 UPPER RESPIRATORY TRACT INFECTION, UNSPECIFIED TYPE: Primary | ICD-10-CM

## 2024-09-29 RX ORDER — AMOXICILLIN AND CLAVULANATE POTASSIUM 875; 125 MG/1; MG/1
875 TABLET, FILM COATED ORAL 2 TIMES DAILY
Qty: 14 TABLET | Refills: 0 | Status: SHIPPED | OUTPATIENT
Start: 2024-09-29 | End: 2024-10-06

## 2024-10-07 NOTE — PROGRESS NOTES
"Patient here today for follow up and to review recent imaging. Her only complaint is chronic back pain. She recently saw Dr. Melara, with spine medicine, and they are recommending surgery.     Fatigue: denies  PO intake: adequate, \"too much\" per patient  Weight: stable  N/V/D/C: denies recent issues    Medications reviewed with patient.       " cr

## 2024-10-29 LAB — NONINV COLON CA DNA+OCC BLD SCRN STL QL: NORMAL

## 2024-11-01 NOTE — PROGRESS NOTES
[facility]   Neurosurgery    Diagnosis  Lorraine was seen today for consult.  Diagnoses and all orders for this visit:  Postlaminectomy syndrome of lumbar region      Patient Discussion/Summary  Lorraine has a longstanding history of low back pain as well as pain from rheumatoid arthritis.  She previously underwent L2-L5 decompression and fusion.  Due to persistent pain which was refractory to nonoperative management, she underwent a trial of spinal cord stimulation using the [employer] system.    She had an excellent trial, with greater than 50% relief of her typical pain.  At times this is was up to 70%.  This covered the majority of her low back and lower extremity pain.  Interestingly, she also had improvement in her shoulder pain and upper extremity joint pain.  The leads did seem to migrate during the trial, resulting in some unpleasant tingling in her lower extremities, although this was mitigated with reprogramming.    As such, she is an excellent candidate for permanent implantation of a [employer] spinal cord stimulator.  This will entail laminectomy for implantation of a paddle electrode.    The procedure was discussed in detail and all of their questions were answered.  We also discussed the risks of the procedure including, but not limited to the risks of bleeding, infection, CSF leak, hardware malfunction, and neurological injury including, but not limited to increased pain, numbness, weakness, paralysis or stroke.  The risks of an anesthetic including cardiorespiratory compromise, coma or death were touched upon, and will be discussed in greater detail by the anesthesiologist.    History of Present Illness  Chief Complaint:   Chief Complaint   Patient presents with    Consult         HPI: Lorraine Iniguez is a 74 y.o. female referred from Dr. Sparks for lumbar spondylosis without myelopathy. Patient has hx of chronic low back pain s/p L2-L5 lumbar fusion. Still with low back  pain after fusion, with radiation into her buttocks but not into her legs/feet. Her pain is constant, which made it hard for her to ambulate. She has multiple injections with varying degrees of relief.  Patient went to [facility] for a second opinion & after having a MRI, they discussed removing all of hardware and extending her fusion; pt declined. She then returned to dr. Sparks and had an successful SCS trial where she received 70% relief of her pain. Patient presents today for SCS implant evaluation.       Patient reports longstanding, severe back pain that has been present for many years. She also reports h/o RA and scoliosis. Patient completed multiple medical modalities, including PT, injections, traction, and medications. However, the pain continued to worsen and she ultimately underwent L2-L5 lumbar fusion in 7/2016 by Dr. Teague. Patient reports that following her surgery the pain did not improve, and in fact worsened. Patient states she now has chronic severe pain in the low back, bilateral hips, and into the knees. She states she went back to review results, and was recommended pain medicine. She also states she was evaluated by spine surgery at [facility], who recommended extensive revision surgery, with removal and extension of her fusion, which patient is not interested in. Throughout this time, she has continued to receive injections, which are occas helpful. She also takes gabapentin, along with other medications, for her RA. Of note, patient has chronic pain of multiple areas, due to her history of RA, scoliosis, as well as right knee injury. Patient was ultimately determined to be a good candidate for spinal cord stimulation, and underwent a trial with i[employer]ling 2-3 weeks ago. Patient reports excellent relief of her back and hip pain, with 50-70% relief with stim, and was able to walk easier. However, she did not care for the sensation of tonic stim. Patient reports that she enjoys remaining active  and independent, and her pain interferes with this. She presents today to discuss permanent placement of spinal cord stimulation.       ROS: As noted in HPI.      Previous History  Past Medical History:   Diagnosis Date    Olecranon bursitis      Past Surgical History:   Procedure Laterality Date    BREAST BIOPSY Left     benign    COLONOSCOPY  2011    CT GUIDED IMAGING FOR NEEDLE PLACEMENT  10/12/2017    CT GUIDED IMAGING FOR NEEDLE PLACEMENT [facility]    CT GUIDED IMAGING FOR NEEDLE PLACEMENT  07/10/2020    CT GUIDED IMAGING FOR NEEDLE PLACEMENT [facility]    CT GUIDED PERCUTANEOUS BIOPSY LUNG  11/30/2022    CT GUIDED PERCUTANEOUS BIOPSY LUNG [facility]    HYSTERECTOMY  1995     Social History     Tobacco Use    Smoking status: Former     Current packs/day: 0.00     Types: Cigarettes     Start date: 1971     Quit date: 2021     Years since quitting: 3.8    Smokeless tobacco: Never   Vaping Use    Vaping status: Never Used   Substance Use Topics    Alcohol use: Never    Drug use: Never     No family history on file.  No Known Allergies  Current Outpatient Medications   Medication Instructions    ascorbic acid (VITAMIN C) 1,000 mg, Daily    BABY ASPIRIN ORAL Baby Aspirin    calcium carbonate-vitamin D3 (Caltrate with Vitamin D3) 600 mg-20 mcg (800 unit) tablet 1 tablet, 2 times daily    cetirizine (ZYRTEC) 10 mg, Daily    cholecalciferol (VITAMIN D3) 50 mcg, Daily    coenzyme Q-10 (CO Q-10) 200 mg, Daily    cyanocobalamin (VITAMIN B-12) 500 mcg, Daily    denosumab (PROLIA SUBQ) 60 mg    eszopiclone (LUNESTA) 3 mg, Nightly    folic acid (FOLVITE) 2 mg, Daily    gabapentin (Neurontin) 300 mg capsule 1 capsule, 3 times daily    glucosamine/chondr hayes A sod (OSTEO BI-FLEX ORAL) 0 Refill(s), Type: Maintenance    hydroxychloroquine (Plaquenil) 200 mg tablet 1 tablet, Daily    Lactobacil 2/Bifido 11/S.therm (HIGH POTENCY PROBIOTIC ORAL) Daily    lisinopril 20 mg, oral, Daily    magnesium  "gluconate (Magonate) 27.5 mg magne- sium (500 mg) tablet 500 mg, 2 times daily    NON FORMULARY Elderberry 500 MG as directed Orally    omega 3-dha-epa-fish oil (Fish OiL) 1,200 (144-216) mg capsule 2 capsules, Daily    Orencia ClickJect 125 mg/mL auto-injector auto-injection     POTASSIUM ORAL 99 mg, Daily    Rasuvo, PF, 15 mg/0.3 mL auto-injector     rosuvastatin (CRESTOR) 20 mg, oral, Daily    torsemide (DEMADEX) 10 mg, oral, Daily    traZODone (DESYREL) 300 mg, Nightly    vitamin B complex (SUPER B-50 COMPLEX ORAL) Super B Complex    zinc gluconate 50 mg tablet 1 tablet, Daily         Vitals  /84   Pulse 77   Resp 18   Ht 1.727 m (5' 8\")   Wt 93 kg (205 lb)   BMI 31.17 kg/m²       Physical Exam  Well appearing, in no acute distress. Moves all extremities equally. Gait deferred due to patient currently in wheelchair.       Results  An MRI of the lumbar spine from May 2024 was reviewed.  This demonstrates evidence of previous posterior decompression and instrumented fusion from L2-L5.  There is residual foraminal stenosis, with no significant central stenosis.  "

## 2024-11-04 ENCOUNTER — HOSPITAL ENCOUNTER (OUTPATIENT)
Dept: RADIOLOGY | Facility: EXTERNAL LOCATION | Age: 74
Discharge: HOME | End: 2024-11-04

## 2024-11-04 ENCOUNTER — OFFICE VISIT (OUTPATIENT)
Dept: NEUROSURGERY | Facility: HOSPITAL | Age: 74
End: 2024-11-04
Payer: MEDICARE

## 2024-11-04 VITALS
HEIGHT: 68 IN | WEIGHT: 205 LBS | DIASTOLIC BLOOD PRESSURE: 84 MMHG | HEART RATE: 77 BPM | BODY MASS INDEX: 31.07 KG/M2 | SYSTOLIC BLOOD PRESSURE: 134 MMHG | RESPIRATION RATE: 18 BRPM

## 2024-11-04 DIAGNOSIS — M96.1 POSTLAMINECTOMY SYNDROME OF LUMBAR REGION: Primary | ICD-10-CM

## 2024-11-04 PROCEDURE — 3079F DIAST BP 80-89 MM HG: CPT | Performed by: NEUROLOGICAL SURGERY

## 2024-11-04 PROCEDURE — 99204 OFFICE O/P NEW MOD 45 MIN: CPT | Performed by: NEUROLOGICAL SURGERY

## 2024-11-04 PROCEDURE — 1036F TOBACCO NON-USER: CPT | Performed by: NEUROLOGICAL SURGERY

## 2024-11-04 PROCEDURE — 3075F SYST BP GE 130 - 139MM HG: CPT | Performed by: NEUROLOGICAL SURGERY

## 2024-11-04 PROCEDURE — 3008F BODY MASS INDEX DOCD: CPT | Performed by: NEUROLOGICAL SURGERY

## 2024-11-04 PROCEDURE — 99214 OFFICE O/P EST MOD 30 MIN: CPT | Performed by: NEUROLOGICAL SURGERY

## 2024-11-04 PROCEDURE — 1159F MED LIST DOCD IN RCRD: CPT | Performed by: NEUROLOGICAL SURGERY

## 2024-11-04 PROCEDURE — 1157F ADVNC CARE PLAN IN RCRD: CPT | Performed by: NEUROLOGICAL SURGERY

## 2024-11-06 ENCOUNTER — HOSPITAL ENCOUNTER (OUTPATIENT)
Dept: RADIOLOGY | Facility: CLINIC | Age: 74
Discharge: HOME | End: 2024-11-06
Payer: MEDICARE

## 2024-11-06 DIAGNOSIS — C34.31 MALIGNANT NEOPLASM OF LOWER LOBE OF RIGHT LUNG (MULTI): ICD-10-CM

## 2024-11-06 PROCEDURE — 71250 CT THORAX DX C-: CPT

## 2024-11-14 ENCOUNTER — APPOINTMENT (OUTPATIENT)
Dept: HEMATOLOGY/ONCOLOGY | Facility: CLINIC | Age: 74
End: 2024-11-14
Payer: MEDICARE

## 2024-11-14 ENCOUNTER — OFFICE VISIT (OUTPATIENT)
Dept: HEMATOLOGY/ONCOLOGY | Facility: CLINIC | Age: 74
End: 2024-11-14
Payer: MEDICARE

## 2024-11-14 VITALS
TEMPERATURE: 97.2 F | SYSTOLIC BLOOD PRESSURE: 134 MMHG | WEIGHT: 209.66 LBS | OXYGEN SATURATION: 99 % | RESPIRATION RATE: 18 BRPM | BODY MASS INDEX: 31.88 KG/M2 | DIASTOLIC BLOOD PRESSURE: 75 MMHG | HEART RATE: 85 BPM

## 2024-11-14 DIAGNOSIS — R93.89 ABNORMAL CT SCAN, CHEST: ICD-10-CM

## 2024-11-14 DIAGNOSIS — C34.31 MALIGNANT NEOPLASM OF LOWER LOBE OF RIGHT LUNG (MULTI): Primary | ICD-10-CM

## 2024-11-14 PROCEDURE — 3078F DIAST BP <80 MM HG: CPT | Performed by: NURSE PRACTITIONER

## 2024-11-14 PROCEDURE — 3075F SYST BP GE 130 - 139MM HG: CPT | Performed by: NURSE PRACTITIONER

## 2024-11-14 PROCEDURE — 99214 OFFICE O/P EST MOD 30 MIN: CPT | Performed by: NURSE PRACTITIONER

## 2024-11-14 PROCEDURE — 1160F RVW MEDS BY RX/DR IN RCRD: CPT | Performed by: NURSE PRACTITIONER

## 2024-11-14 PROCEDURE — 1157F ADVNC CARE PLAN IN RCRD: CPT | Performed by: NURSE PRACTITIONER

## 2024-11-14 PROCEDURE — 1159F MED LIST DOCD IN RCRD: CPT | Performed by: NURSE PRACTITIONER

## 2024-11-14 PROCEDURE — 1125F AMNT PAIN NOTED PAIN PRSNT: CPT | Performed by: NURSE PRACTITIONER

## 2024-11-14 ASSESSMENT — PAIN SCALES - GENERAL: PAINLEVEL_OUTOF10: 8

## 2024-11-14 ASSESSMENT — COLUMBIA-SUICIDE SEVERITY RATING SCALE - C-SSRS
2. HAVE YOU ACTUALLY HAD ANY THOUGHTS OF KILLING YOURSELF?: NO
6. HAVE YOU EVER DONE ANYTHING, STARTED TO DO ANYTHING, OR PREPARED TO DO ANYTHING TO END YOUR LIFE?: NO
1. IN THE PAST MONTH, HAVE YOU WISHED YOU WERE DEAD OR WISHED YOU COULD GO TO SLEEP AND NOT WAKE UP?: NO

## 2024-11-14 NOTE — PROGRESS NOTES
Patient here today for follow up. She was last seen in May by Dr. Bishop.     Fatigue: denies  PO intake: adequate  Weight: stable  N/V/D/C: denies    Medications and pharmacy preference reviewed with patient.     CT and follow up in 3 months.

## 2024-11-14 NOTE — PROGRESS NOTES
Subjective:   Patient Name: Lorraine Iniguez    : 1950     MRN: [id number]     Age: 74 y.o.     Gender: female  Referring Provider: ZENA  Pt number: [phone]    CC: Management of newly diagnosed working stage IA3  ([id number]/1c ) squamous cell lung cancer. PDL-1 2%. NGS not done due to insufficient tissue.  22: RLL wedge resection: NSCLC with predominantly squamous cell and components of glandular findings. Stage IA2 ([id number]), PDL-1 <1%. NGS KALYANI, TP53 [id number].      Presenting History (10/11/2022):  Ms Iniguez is a 72-year-old F, active smoker (0.5 pack per day for ~ 60 years, 30 py smoking) with PMHx of RA  (dx in late 30s, currently controlled with MTX and Abatacept) who is referred here for the management of newly diagnosed likely metastatic squamous cell lung cancer. She has been having long-standing  long nodules that potentially related to her RA, and it has been followed with serial scans. She had LLL nodule biopsy in 10/2017 which  showed necrotic tissue with granulation tissue proliferation, fibrosis and chronic active inflammation. She had another RLL CT guided biopsy in 2020 which again negative for malignancy. On the LDCT screening scan on 2022, she was found to have  multiple bilateral lung nodules. She underwent PET/CT on 2022 which showed multiple new pulmonary nodules are seen in bilateral  lungs suspicious for neoplasm. Metastatic disease cannot be excluded. No axillary, hilar or mediastinal adenopathy. Stable R hepatic lobe cyst measuring 14mm without radiotracer activity. Unchanged renal cyst. She underwent CT-guided lung biopsy of RLL  nodule on 09/15/2022 with pathology revealing invasive squamous cell carcinoma. IHC positive for p40, CK7, CK5/6 and some staining for TTF-1. Negative for synaptophysin, CK20, napsin A and chromogranin.   She presented to clinic today accompanied by her daughter.  She is overall doing well.  She endorses dyspnea on exertion if  walking for a long distance.  She also uses a cane if she needs to walk outside.  She has mild cough which was stable.  She denies  any hemoptysis, fever, chills, nausea, vomiting, diarrhea, headache, or visual change.  She does have chronic joint pain which varies.  She endorses left knee pain and R sided chest pain since the biopsy, which is well controlled by over-the-counter pain  medication.     Treatment Summary:   11/28/2022: RLL wedge resection (Dr. Shi)    Interval History (11/15/2024):    Patient presents for a follow up visit and scan review. On presentation today her primary complaint is chronic back and joint pains. She is followed by rheumatology for RA and pain management. She denies any fevers, chills or night sweats. No cough or chest pain. Occasional GAGE. No nausea or vomiting. No constipation or diarrhea. No urinary symptoms. No rash. No neuropathy.      Review of Systems:  A review of systems has been completed and are negative for complaints except what is stated in the HPI and/or past medical history      Medical History:   RA, insomnia    Surgical History:   Past Surgical History:   Procedure Laterality Date    BREAST BIOPSY Left     benign    CT GUIDED IMAGING FOR NEEDLE PLACEMENT  10/12/2017    CT GUIDED IMAGING FOR NEEDLE PLACEMENT [facility]    CT GUIDED IMAGING FOR NEEDLE PLACEMENT  07/10/2020    [facility]    CT GUIDED PERCUTANEOUS BIOPSY LUNG  11/30/2022    CT [facility]UID[facility]UNG [facility]       Family History:  No family history on file.    Allergies:  No Known Allergies      Meds (Current):    Current Outpatient Medications:     ascorbic acid (Vitamin C) 1,000 mg tablet, Take 1 tablet (1,000 mg) by mouth once daily.  for 30 day(s), Disp: , Rfl:     BABY ASPIRIN ORAL, Baby Aspirin, Disp: , Rfl:     calcium carbonate-vitamin D3 (Caltrate with Vitamin D3) 600 mg-20 mcg (800 unit) tablet, Take 1 tablet by  mouth 2 times a day., Disp: , Rfl:     cetirizine (ZyrTEC) 10 mg tablet, Take 1 tablet (10 mg) by mouth once daily., Disp: , Rfl:     coenzyme Q-10 (Co Q-10) 200 mg capsule, Take 1 capsule (200 mg) by mouth once daily., Disp: , Rfl:     cyanocobalamin (Vitamin B-12) 1,000 mcg tablet, Take 0.5 tablets (500 mcg) by mouth once daily., Disp: , Rfl:     denosumab (PROLIA SUBQ), Inject 60 mg under the skin. q6 mo, Disp: , Rfl:     eszopiclone (Lunesta) 3 mg tablet, Take 1 tablet (3 mg) by mouth once daily at bedtime., Disp: , Rfl:     folic acid (Folvite) 1 mg tablet, Take 2 tablets (2 mg) by mouth once daily., Disp: , Rfl:     gabapentin (Neurontin) 300 mg capsule, Take 1 capsule (300 mg) by mouth 3 times a day., Disp: , Rfl:     glucosamine/chondr hayes A sod (OSTEO BI-FLEX ORAL), 0 Refill(s), Type: Maintenance, Disp: , Rfl:     hydroxychloroquine (Plaquenil) 200 mg tablet, Take 1 tablet (200 mg) by mouth once daily., Disp: , Rfl:     Lactobacil 2/Bifido 11/S.therm (HIGH POTENCY PROBIOTIC ORAL), Take by mouth once daily., Disp: , Rfl:     lisinopril 20 mg tablet, Take 1 tablet (20 mg) by mouth once daily., Disp: 90 tablet, Rfl: 3    magnesium gluconate (Magonate) 27.5 mg magne- sium (500 mg) tablet, Take 500 mg by mouth 2 times a day., Disp: , Rfl:     NON FORMULARY, Elderberry 500 MG as directed Orally, Disp: , Rfl:     omega 3-dha-epa-fish oil (Fish OiL) 1,200 (144-216) mg capsule, Take 2 capsules (2,400 mg) by mouth once daily., Disp: , Rfl:     Orencia ClickJect 125 mg/mL auto-injector auto-injection, , Disp: , Rfl:     POTASSIUM ORAL, Take 99 mg by mouth once daily. for 30 day(s), Disp: , Rfl:     Rasuvo, PF, 15 mg/0.3 mL auto-injector, , Disp: , Rfl:     rosuvastatin (Crestor) 20 mg tablet, TAKE 1 TABLET BY MOUTH EVERY DAY, Disp: 90 tablet, Rfl: 0    torsemide (Demadex) 10 mg tablet, Take 1 tablet (10 mg) by mouth once daily., Disp: 90 tablet, Rfl: 3    traZODone (Desyrel) 300 mg tablet, Take 1 tablet (300 mg) by  mouth once daily at bedtime.  for 30 day(s), Disp: , Rfl:     vitamin B complex (SUPER B-50 COMPLEX ORAL), Super B Complex, Disp: , Rfl:     zinc gluconate 50 mg tablet, Take 1 tablet (50 mg) by mouth once daily.  for 30 day(s), Disp: , Rfl:       Physical Exam:  /75 (BP Location: Left arm, Patient Position: Sitting, BP Cuff Size: Adult long)   Pulse 85   Temp 36.2 °C (97.2 °F) (Temporal)   Resp 18   Wt 95.1 kg (209 lb 10.5 oz)   SpO2 99%   BMI 31.88 kg/m²     Wt Readings from Last 5 Encounters:   24 95.1 kg (209 lb 10.5 oz)   24 93 kg (205 lb)   24 93 kg (205 lb)   24 92.4 kg (203 lb 13 oz)   24 96.2 kg (212 lb)       Performance Status (ECOG): 1    ECOG Definition  0 Fully active; no performance restrictions.  1 Strenuous physical activity restricted; fully ambulatory and able to carry out light work.  2 Capable of all self-care but unable to carry out any work activities. Up and about >50% of waking hours.  3 Capable of only limited self-care; confined to bed or chair >50% of waking hours.  4 Completely disabled; cannot carry out any self-care; totally confined to bed or chair.    Vital Signs:  /75 (BP Location: Left arm, Patient Position: Sitting, BP Cuff Size: Adult long)   Pulse 85   Temp 36.2 °C (97.2 °F) (Temporal)   Resp 18   Wt 95.1 kg (209 lb 10.5 oz)   SpO2 99%   BMI 31.88 kg/m²     Wt Readings from Last 5 Encounters:   24 95.1 kg (209 lb 10.5 oz)   24 93 kg (205 lb)   24 93 kg (205 lb)   24 92.4 kg (203 lb 13 oz)   24 96.2 kg (212 lb)     Physical Exam:  ECO- RA  Pain: chronic RA  Constitutional: Well developed, awake/alert/oriented x3, no distress, alert and cooperative  Eyes: PER. sclera anicteric  ENMT: Oral mucosa moist, no lesions or thrush identified  Respiratory/Thorax: Breathing is non-labored. Lungs are clear to auscultation bilaterally. No adventitious breath sounds  Cardiovascular: S1-S2. Regular rate and  rhythm. No murmurs, rubs, or gallops appreciated  Gastrointestinal: Abdomen soft nontender, nondistended, normal active bowel sounds.  Musculoskeletal: ROM intact, no joint swelling, normal strength  Extremities: normal extremities, no cyanosis, no edema, no clubbing  Lymphatics: no palpable lymphadenopathy   Skin: no rash  Neurologic: alert and oriented x3. Nonfocal exam. No myoclonus  Psychological: Pleasant, appropriate and easily engaged     Labs:     No new labs      Imaging:  CT Chest 11/6/2024  IMPRESSION:  1.  Couple of right lower lobe nodular densities measuring up to 8 x 8 mm, slightly increased in size compared to prior study. Recommend short-term follow-up chest CT in 3 months for continued surveillance and to exclude tumor recurrence.  2. Surgical changes related to right lower lobe wedge resection.  3. Extensive smoking-related changes including subpleural reticulation and emphysematous changes.  4. Severe coronary artery calcification. Correlate with coronary artery disease risk factors.     Assessment/Plan:   74 y.o. female RA who is referred here for the management of newly diagnosed  working stage IV metastatic squamous cell lung cancer. PDL-1 2%; NGS: insufficient tissue. She had RLL wedge resection with Dr. Shi  on 11/28/2022, [id number]. Doing well so far.      Plan:    The patient's records and imaging have been reviewed in detail.  I have discussed with the patient the natural history of their disease  at length.  The following has also been discussed with the patient:     # Working stage IA2 ([id number])) squamous cell lung cancer, PDL-1 2%. She underwent  RLL wedge resection with Dr. Shi on 11/28/22 with pathology revealing invasive NSCLC predominantly squamous cell features and focal glandular components tumor measure 1.6cm. All LN including 7, 8 10R were negative (0/10-11). No LVI or VPI. Margins  were negative. PDL-1 <1%. NGS KALYANI, TP53 p.[id number]. We discussed that given the small  size of her tumor, no adjuvant chemotherapy is indicated in this setting. We will continue with surveillance scan every 6 months for 2-3 years and annually.                 - Interval Imaging: 10/12/2023: CT chest (-) 11.  No lymphadenopathy in the chest. Previously noted subcarinal lymph node less conspicuous 2. Stable appearing pulmonary nodules 3. Chronic appearing interstitial changes stable. 2024 scan pending final reads.      - Interval Imagin2024: CT chest (-) 1.  Couple of right lower lobe nodular densities measuring up to 8 x  8 mm, slightly increased in size compared to prior study. Recommend short-term follow-up chest CT in 3 months for continued surveillance and to exclude tumor recurrence. 2. Surgical changes related to right lower lobe wedge resection.  3. Extensive smoking-related changes including subpleural reticulation and emphysematous changes. 4. Severe coronary artery calcification. Correlate with coronary artery disease risk factors.     # RA: follows with Dr. Betsy Prabhakar [phone].      # Clinical Trials:  None currently.      # Access: Peripheral veins.     # Pain: Pain due to RA. Had recent flare. Currently on tramadol as needed. Pending orthopedic appt. Will refer to integrative medicine for uncontrolled back pain and knee pain.      # Bone Health: No signs of bony disease.     # Psychosocial: Coping well, no acute issues.  Good support from family & friends.       # GI/CINV: No acute issues.  Eating and voiding well.       # Smoking: Former smoker, quitted in      # Fertility: N/A.  Oncofertility support available as needed.       # Heme/ESAs: N/A.     # GOC: Patient is aware of curative intent goals of care.     # Language: English.     # Dispo:   RTC in 3 months with scans prior    Luis M Allison, SHARIF-CNP  [facility]  Thoracic + H&N Medical Oncology     I spent a total of 30+ minutes on the date of the service which included preparing to see the patient,  face-to-face patient care, completing clinical documentation, obtaining and / or reviewing separately obtained history, counseling and educating the patient/family/caregiver, ordering medications, tests, or procedures, communicating with other healthcare providers (not separately reported), independently interpreting results, not separately reported, and communicating results to the patient/family/caregiver, Name and date of birth verified.

## 2024-11-20 ENCOUNTER — APPOINTMENT (OUTPATIENT)
Dept: PRIMARY CARE | Facility: CLINIC | Age: 74
End: 2024-11-20
Payer: MEDICARE

## 2024-11-22 DIAGNOSIS — E78.1 PURE HYPERGLYCERIDEMIA: ICD-10-CM

## 2024-11-22 RX ORDER — ROSUVASTATIN CALCIUM 20 MG/1
20 TABLET, COATED ORAL DAILY
Qty: 90 TABLET | Refills: 1 | Status: SHIPPED | OUTPATIENT
Start: 2024-11-22

## 2024-12-01 LAB — NONINV COLON CA DNA+OCC BLD SCRN STL QL: NEGATIVE

## 2024-12-19 ENCOUNTER — APPOINTMENT (OUTPATIENT)
Dept: PRIMARY CARE | Facility: CLINIC | Age: 74
End: 2024-12-19
Payer: MEDICARE

## 2024-12-19 ENCOUNTER — LAB (OUTPATIENT)
Dept: LAB | Facility: LAB | Age: 74
End: 2024-12-19
Payer: MEDICARE

## 2024-12-19 VITALS
DIASTOLIC BLOOD PRESSURE: 74 MMHG | OXYGEN SATURATION: 96 % | WEIGHT: 207 LBS | HEART RATE: 76 BPM | TEMPERATURE: 96.2 F | BODY MASS INDEX: 31.37 KG/M2 | HEIGHT: 68 IN | SYSTOLIC BLOOD PRESSURE: 126 MMHG

## 2024-12-19 DIAGNOSIS — E55.9 VITAMIN D DEFICIENCY, UNSPECIFIED: ICD-10-CM

## 2024-12-19 DIAGNOSIS — K21.9 GASTROESOPHAGEAL REFLUX DISEASE WITHOUT ESOPHAGITIS: Primary | ICD-10-CM

## 2024-12-19 DIAGNOSIS — M81.0 AGE-RELATED OSTEOPOROSIS WITHOUT CURRENT PATHOLOGICAL FRACTURE: ICD-10-CM

## 2024-12-19 DIAGNOSIS — Z79.899 OTHER LONG TERM (CURRENT) DRUG THERAPY: ICD-10-CM

## 2024-12-19 DIAGNOSIS — M05.79 RHEUMATOID ARTHRITIS WITH RHEUMATOID FACTOR OF MULTIPLE SITES WITHOUT ORGAN OR SYSTEMS INVOLVEMENT (MULTI): ICD-10-CM

## 2024-12-19 DIAGNOSIS — E53.8 DEFICIENCY OF OTHER SPECIFIED B GROUP VITAMINS: ICD-10-CM

## 2024-12-19 DIAGNOSIS — M05.19: Primary | ICD-10-CM

## 2024-12-19 DIAGNOSIS — R53.83 OTHER FATIGUE: ICD-10-CM

## 2024-12-19 LAB
ALBUMIN SERPL BCP-MCNC: 4.1 G/DL (ref 3.4–5)
ALP SERPL-CCNC: 47 U/L (ref 33–136)
ALT SERPL W P-5'-P-CCNC: 23 U/L (ref 7–45)
ANION GAP SERPL CALCULATED.3IONS-SCNC: 11 MMOL/L (ref 10–20)
AST SERPL W P-5'-P-CCNC: 27 U/L (ref 9–39)
BASOPHILS # BLD AUTO: 0.04 X10*3/UL (ref 0–0.1)
BASOPHILS NFR BLD AUTO: 0.5 %
BILIRUB SERPL-MCNC: 0.5 MG/DL (ref 0–1.2)
BUN SERPL-MCNC: 18 MG/DL (ref 6–23)
CALCIUM SERPL-MCNC: 9 MG/DL (ref 8.6–10.3)
CHLORIDE SERPL-SCNC: 104 MMOL/L (ref 98–107)
CO2 SERPL-SCNC: 29 MMOL/L (ref 21–32)
CREAT SERPL-MCNC: 0.88 MG/DL (ref 0.5–1.05)
CRP SERPL-MCNC: <0.1 MG/DL
EGFRCR SERPLBLD CKD-EPI 2021: 69 ML/MIN/1.73M*2
EOSINOPHIL # BLD AUTO: 0.21 X10*3/UL (ref 0–0.4)
EOSINOPHIL NFR BLD AUTO: 2.8 %
ERYTHROCYTE [DISTWIDTH] IN BLOOD BY AUTOMATED COUNT: 12.7 % (ref 11.5–14.5)
GLUCOSE SERPL-MCNC: 95 MG/DL (ref 74–99)
HCT VFR BLD AUTO: 46.5 % (ref 36–46)
HGB BLD-MCNC: 14.9 G/DL (ref 12–16)
IMM GRANULOCYTES # BLD AUTO: 0.04 X10*3/UL (ref 0–0.5)
IMM GRANULOCYTES NFR BLD AUTO: 0.5 % (ref 0–0.9)
LYMPHOCYTES # BLD AUTO: 1.57 X10*3/UL (ref 0.8–3)
LYMPHOCYTES NFR BLD AUTO: 20.7 %
MCH RBC QN AUTO: 31.6 PG (ref 26–34)
MCHC RBC AUTO-ENTMCNC: 32 G/DL (ref 32–36)
MCV RBC AUTO: 99 FL (ref 80–100)
MONOCYTES # BLD AUTO: 0.45 X10*3/UL (ref 0.05–0.8)
MONOCYTES NFR BLD AUTO: 5.9 %
NEUTROPHILS # BLD AUTO: 5.29 X10*3/UL (ref 1.6–5.5)
NEUTROPHILS NFR BLD AUTO: 69.6 %
NRBC BLD-RTO: 0 /100 WBCS (ref 0–0)
PLATELET # BLD AUTO: 210 X10*3/UL (ref 150–450)
POTASSIUM SERPL-SCNC: 4.1 MMOL/L (ref 3.5–5.3)
PROT SERPL-MCNC: 6.9 G/DL (ref 6.4–8.2)
RBC # BLD AUTO: 4.72 X10*6/UL (ref 4–5.2)
SODIUM SERPL-SCNC: 140 MMOL/L (ref 136–145)
WBC # BLD AUTO: 7.6 X10*3/UL (ref 4.4–11.3)

## 2024-12-19 PROCEDURE — 85652 RBC SED RATE AUTOMATED: CPT

## 2024-12-19 PROCEDURE — 36415 COLL VENOUS BLD VENIPUNCTURE: CPT

## 2024-12-19 PROCEDURE — 86140 C-REACTIVE PROTEIN: CPT

## 2024-12-19 PROCEDURE — 80053 COMPREHEN METABOLIC PANEL: CPT

## 2024-12-19 PROCEDURE — 1159F MED LIST DOCD IN RCRD: CPT | Performed by: FAMILY MEDICINE

## 2024-12-19 PROCEDURE — 3078F DIAST BP <80 MM HG: CPT | Performed by: FAMILY MEDICINE

## 2024-12-19 PROCEDURE — 1158F ADVNC CARE PLAN TLK DOCD: CPT | Performed by: FAMILY MEDICINE

## 2024-12-19 PROCEDURE — 1157F ADVNC CARE PLAN IN RCRD: CPT | Performed by: FAMILY MEDICINE

## 2024-12-19 PROCEDURE — 99213 OFFICE O/P EST LOW 20 MIN: CPT | Performed by: FAMILY MEDICINE

## 2024-12-19 PROCEDURE — 85025 COMPLETE CBC W/AUTO DIFF WBC: CPT

## 2024-12-19 PROCEDURE — 3074F SYST BP LT 130 MM HG: CPT | Performed by: FAMILY MEDICINE

## 2024-12-19 PROCEDURE — 1036F TOBACCO NON-USER: CPT | Performed by: FAMILY MEDICINE

## 2024-12-19 PROCEDURE — 1123F ACP DISCUSS/DSCN MKR DOCD: CPT | Performed by: FAMILY MEDICINE

## 2024-12-19 PROCEDURE — 3008F BODY MASS INDEX DOCD: CPT | Performed by: FAMILY MEDICINE

## 2024-12-19 PROCEDURE — 1126F AMNT PAIN NOTED NONE PRSNT: CPT | Performed by: FAMILY MEDICINE

## 2024-12-19 RX ORDER — OMEPRAZOLE 20 MG/1
20 CAPSULE, DELAYED RELEASE ORAL DAILY
Qty: 90 CAPSULE | Refills: 3 | Status: SHIPPED | OUTPATIENT
Start: 2024-12-19 | End: 2025-12-19

## 2024-12-19 ASSESSMENT — PATIENT HEALTH QUESTIONNAIRE - PHQ9
2. FEELING DOWN, DEPRESSED OR HOPELESS: NOT AT ALL
1. LITTLE INTEREST OR PLEASURE IN DOING THINGS: NOT AT ALL
SUM OF ALL RESPONSES TO PHQ9 QUESTIONS 1 AND 2: 0

## 2024-12-19 ASSESSMENT — PAIN SCALES - GENERAL: PAINLEVEL_OUTOF10: 0-NO PAIN

## 2024-12-19 NOTE — PROGRESS NOTES
"Subjective   Patient ID: Lorraine Iniguez is a 74 y.o. female who presents for GERD ( Increased acid reflux over the past six months.   Patient states she was diagnosed with a hiatal hernia several years ago.//Using Pepcid OTC which seems to help over the past 2 weeks/Medication list- patient says everything is the same).    HPI Here for reflux worsening in the past 6 months.  Pt originally tried TUMS but her Rheum specialist told her to stop due to high Ca++ levels. She then used Pepcid prn but it is not helping.  She is getting acid in her mouth and it worsens when she sleeps on her right side.  Pt has chronic pain and recently had a temporary stimulator in her back that was very effective.  She is scheduled for a permanent stimulator placement in the next few months.    Review of Systems  Constitutional: Patient is negative for fever, fatigue, weight change.  HEENT: Patient is negative for change in vision, hearing, swallow.  Cardio: Patient is negative for chest pain, lower extremity edema.  Pulmonary: Patient is negative for cough, shortness of breath.  Gastrointestinal: Patient is positive for acid reflux.  Objective   /74 (BP Location: Left arm, Patient Position: Sitting, BP Cuff Size: Large adult)   Pulse 76   Temp 35.7 °C (96.2 °F) (Temporal)   Ht 1.727 m (5' 8\")   Wt 93.9 kg (207 lb)   SpO2 96%   BMI 31.47 kg/m²     Physical Exam    Assessment/Plan   Problem List Items Addressed This Visit    None  Visit Diagnoses         Codes    Gastroesophageal reflux disease without esophagitis    -needs better control.  Begin omeprazole 20 mg daily.  Patient is advised to take the medication for at least 6 weeks.  If she is no better in 1 month she will follow-up here.  Primary K21.9    Relevant Medications    omeprazole (PriLOSEC) 20 mg DR capsule               "

## 2024-12-20 LAB — ERYTHROCYTE [SEDIMENTATION RATE] IN BLOOD BY WESTERGREN METHOD: 8 MM/H (ref 0–30)

## 2025-01-08 PROCEDURE — 88175 CYTOPATH C/V AUTO FLUID REDO: CPT

## 2025-01-09 ENCOUNTER — LAB REQUISITION (OUTPATIENT)
Dept: LAB | Facility: HOSPITAL | Age: 75
End: 2025-01-09
Payer: MEDICARE

## 2025-01-09 DIAGNOSIS — Z01.419 ENCOUNTER FOR GYNECOLOGICAL EXAMINATION (GENERAL) (ROUTINE) WITHOUT ABNORMAL FINDINGS: ICD-10-CM

## 2025-01-09 DIAGNOSIS — Z11.51 ENCOUNTER FOR SCREENING FOR HUMAN PAPILLOMAVIRUS (HPV): ICD-10-CM

## 2025-01-16 LAB
CYTOLOGY CMNT CVX/VAG CYTO-IMP: NORMAL
LAB AP HPV GENOTYPE QUESTION: YES
LAB AP HPV HR: NORMAL
LABORATORY COMMENT REPORT: NORMAL
LMP START DATE: NORMAL
PATH REPORT.TOTAL CANCER: NORMAL

## 2025-01-27 ENCOUNTER — APPOINTMENT (OUTPATIENT)
Dept: RADIOLOGY | Facility: CLINIC | Age: 75
End: 2025-01-27
Payer: MEDICARE

## 2025-01-31 ENCOUNTER — TELEPHONE (OUTPATIENT)
Dept: NEUROSURGERY | Facility: HOSPITAL | Age: 75
End: 2025-01-31
Payer: MEDICARE

## 2025-01-31 NOTE — TELEPHONE ENCOUNTER
I called and spoke with patient to discuss surgeyr planning. She is now scheduled to have paddke lead scs implant with Dr. Marquez on 3/13 at [facility]. She is aware of preop testing needed prior to surgery. All questions answered. She understands and agrees to plan.

## 2025-02-05 ENCOUNTER — OFFICE VISIT (OUTPATIENT)
Dept: PRIMARY CARE | Facility: CLINIC | Age: 75
End: 2025-02-05
Payer: MEDICARE

## 2025-02-05 VITALS
HEART RATE: 64 BPM | TEMPERATURE: 96.7 F | SYSTOLIC BLOOD PRESSURE: 126 MMHG | BODY MASS INDEX: 30.92 KG/M2 | HEIGHT: 68 IN | OXYGEN SATURATION: 95 % | DIASTOLIC BLOOD PRESSURE: 80 MMHG | WEIGHT: 204 LBS

## 2025-02-05 DIAGNOSIS — B37.0 THRUSH, ORAL: Primary | ICD-10-CM

## 2025-02-05 PROCEDURE — 3079F DIAST BP 80-89 MM HG: CPT | Performed by: FAMILY MEDICINE

## 2025-02-05 PROCEDURE — 3074F SYST BP LT 130 MM HG: CPT | Performed by: FAMILY MEDICINE

## 2025-02-05 PROCEDURE — 1036F TOBACCO NON-USER: CPT | Performed by: FAMILY MEDICINE

## 2025-02-05 PROCEDURE — 3008F BODY MASS INDEX DOCD: CPT | Performed by: FAMILY MEDICINE

## 2025-02-05 PROCEDURE — 99213 OFFICE O/P EST LOW 20 MIN: CPT | Performed by: FAMILY MEDICINE

## 2025-02-05 PROCEDURE — 1123F ACP DISCUSS/DSCN MKR DOCD: CPT | Performed by: FAMILY MEDICINE

## 2025-02-05 PROCEDURE — 1125F AMNT PAIN NOTED PAIN PRSNT: CPT | Performed by: FAMILY MEDICINE

## 2025-02-05 PROCEDURE — 1157F ADVNC CARE PLAN IN RCRD: CPT | Performed by: FAMILY MEDICINE

## 2025-02-05 RX ORDER — NYSTATIN 100000 [USP'U]/ML
500000 SUSPENSION ORAL 4 TIMES DAILY
Qty: 280 ML | Refills: 0 | Status: SHIPPED | OUTPATIENT
Start: 2025-02-05 | End: 2025-02-19

## 2025-02-05 ASSESSMENT — PATIENT HEALTH QUESTIONNAIRE - PHQ9
SUM OF ALL RESPONSES TO PHQ9 QUESTIONS 1 AND 2: 0
1. LITTLE INTEREST OR PLEASURE IN DOING THINGS: NOT AT ALL
2. FEELING DOWN, DEPRESSED OR HOPELESS: NOT AT ALL

## 2025-02-05 ASSESSMENT — PAIN SCALES - GENERAL: PAINLEVEL_OUTOF10: 1

## 2025-02-05 NOTE — PROGRESS NOTES
"Subjective   Patient ID: Lorraine Iniguez is a 74 y.o. female who presents for Mouth Lesions (Inflammation and redness top of mouth and right upper gum area x 1-2 weeks ago).    HPI patient is here with a about 2-week history of red irritations that are painful on the roof of her mouth on the hard palate and in the maxillary gutter.  She feels a firmness in her right side of her neck.  She does not have a fever.    Review of Systems  Constitutional: Patient is negative for fever, fatigue, weight change.  HEENT: Patient is positive for red irritations in the mouth.  She is negative for change in vision, hearing, swallow.  Cardio: Patient is negative for chest pain, lower extremity edema.  Pulmonary: Patient is negative for cough, shortness of breath.  Objective   /80 (BP Location: Left arm, Patient Position: Sitting, BP Cuff Size: Adult)   Pulse 64   Temp 35.9 °C (96.7 °F)   Ht 1.727 m (5' 8\")   Wt 92.5 kg (204 lb)   SpO2 95%   BMI 31.02 kg/m²     Physical Exam  General: Awake and alert no apparent distress.  HEENT: Moist oral mucosa with discrete areas of erythema to the soft and hard palate posteriorly and along the tonsillar pillars.  There is mild right anterior cervical lymphadenopathy.  Cardio: Heart S1-S2 no murmur rub or gallop.  Pulmonary: Lungs clear to auscultation bilaterally.  Assessment/Plan   Problem List Items Addressed This Visit    None  Visit Diagnoses         Codes    Thrush, oral    -  Primary needs better control.  In the differential is thrush.  Will give patient antifungals.  If she is no better in 2 weeks she knows that she will be referred to ENT for further evaluation. B37.0    Relevant Medications    nystatin (Mycostatin) 100,000 unit/mL suspension               "

## 2025-02-06 ENCOUNTER — PREP FOR PROCEDURE (OUTPATIENT)
Dept: NEUROSURGERY | Facility: HOSPITAL | Age: 75
End: 2025-02-06
Payer: MEDICARE

## 2025-02-06 DIAGNOSIS — M96.1 POSTLAMINECTOMY SYNDROME OF LUMBAR REGION: Primary | ICD-10-CM

## 2025-02-27 ENCOUNTER — HOSPITAL ENCOUNTER (OUTPATIENT)
Dept: RADIOLOGY | Facility: CLINIC | Age: 75
Discharge: HOME | End: 2025-02-27
Payer: MEDICARE

## 2025-02-27 ENCOUNTER — CLINICAL SUPPORT (OUTPATIENT)
Dept: PREADMISSION TESTING | Facility: HOSPITAL | Age: 75
End: 2025-02-27
Payer: MEDICARE

## 2025-02-27 VITALS — BODY MASS INDEX: 30.92 KG/M2 | HEIGHT: 68 IN | WEIGHT: 204 LBS

## 2025-02-27 DIAGNOSIS — Z12.31 ENCOUNTER FOR SCREENING MAMMOGRAM FOR MALIGNANT NEOPLASM OF BREAST: ICD-10-CM

## 2025-02-27 DIAGNOSIS — M81.0 AGE-RELATED OSTEOPOROSIS WITHOUT CURRENT PATHOLOGICAL FRACTURE: ICD-10-CM

## 2025-02-27 DIAGNOSIS — R93.89 ABNORMAL CT SCAN, CHEST: ICD-10-CM

## 2025-02-27 DIAGNOSIS — C34.31 MALIGNANT NEOPLASM OF LOWER LOBE OF RIGHT LUNG (MULTI): ICD-10-CM

## 2025-02-27 PROCEDURE — 77080 DXA BONE DENSITY AXIAL: CPT

## 2025-02-27 PROCEDURE — 77067 SCR MAMMO BI INCL CAD: CPT | Performed by: RADIOLOGY

## 2025-02-27 PROCEDURE — 71250 CT THORAX DX C-: CPT

## 2025-02-27 PROCEDURE — 77063 BREAST TOMOSYNTHESIS BI: CPT | Performed by: RADIOLOGY

## 2025-02-27 PROCEDURE — 77080 DXA BONE DENSITY AXIAL: CPT | Performed by: RADIOLOGY

## 2025-02-27 PROCEDURE — 77067 SCR MAMMO BI INCL CAD: CPT

## 2025-02-27 NOTE — CPM/PAT H&P
CPM/PAT Evaluation       Name: Lorraine Iniguez (Lorraine Iniguez)  /Age: [birth year]//74 y.o.     { PAT Visit Type:21901}      Chief Complaint: ***    HPI  Lorraine Iniguez is scheduled for Thoracic Lami for Paddle Spinal Cord Stimulator Electrode and Generator Implant ([employer]) with Dr. Marquez on 3/13/25.  Past Medical History:   Diagnosis Date    Chronic pain disorder     s/p L2-L5 lumbar fusion    Coronary artery disease     Hypertension     Lumbar spondylosis with myelopathy     Olecranon bursitis     Osteoporosis     Postlaminectomy syndrome of lumbar region     RA (rheumatoid arthritis)     Scoliosis     Squamous cell lung cancer (Multi)     s/p RLL wedge resection 22       Past Surgical History:   Procedure Laterality Date    BREAST BIOPSY Left     benign    COLONOSCOPY      CT GUIDED IMAGING FOR NEEDLE PLACEMENT  10/12/2017    CT GUIDED IMAGING FOR NEEDLE PLACEMENT [facility]    CT GUIDED IMAGING FOR NEEDLE PLACEMENT  07/10/2020    CT GUIDED IMAGING FOR NEEDLE PLACEMENT [facility]    CT GUIDED PERCUTANEOUS BIOPSY LUNG  2022    CT GUIDED PERCUTANEOUS BIOPSY LUNG [facility]    HYSTERECTOMY  1995    LUMBAR FUSION  2016    s/p L2-L5 lumbar fusion    LUNG REMOVAL, PARTIAL  2022    RLL wedge resection       Patient  has no history on file for sexual activity.    No family history on file.    No Known Allergies    Prior to Admission medications    Medication Sig Start Date End Date Taking? Authorizing Provider   ascorbic acid (Vitamin C) 1,000 mg tablet Take 1 tablet (1,000 mg) by mouth once daily.  for 30 day(s)    Historical Provider, MD   BABY ASPIRIN ORAL Baby Aspirin    Historical Provider, MD   calcium carbonate-vitamin D3 (Caltrate with Vitamin D3) 600 mg-20 mcg (800 unit) tablet Take 1 tablet by mouth 2 times a day. 21   Historical Provider, MD   cetirizine (ZyrTEC) 10 mg tablet Take 1 tablet (10 mg) by mouth once daily. 18    Historical Provider, MD   coenzyme Q-10 (Co Q-10) 200 mg capsule Take 1 capsule (200 mg) by mouth once daily. 5/11/21   Historical Provider, MD   cyanocobalamin (Vitamin B-12) 1,000 mcg tablet Take 0.5 tablets (500 mcg) by mouth once daily. 5/11/21   Historical Provider, MD   denosumab (PROLIA SUBQ) Inject 60 mg under the skin. q6 mo 5/11/21   Historical Provider, MD   eszopiclone (Lunesta) 3 mg tablet Take 1 tablet (3 mg) by mouth once daily at bedtime.    Historical Provider, MD   folic acid (Folvite) 1 mg tablet Take 2 tablets (2 mg) by mouth once daily.    Historical Provider, MD   gabapentin (Neurontin) 300 mg capsule Take 1 capsule (300 mg) by mouth 3 times a day. 8/24/15   Historical Provider, MD   glucosamine/chondr hayes A sod (OSTEO BI-FLEX ORAL) 0 Refill(s), Type: Maintenance 5/11/21   Historical Provider, MD   hydroxychloroquine (Plaquenil) 200 mg tablet Take 1 tablet (200 mg) by mouth once daily.    Historical Provider, MD   Lactobacil 2/Bifido 11/S.therm (HIGH POTENCY PROBIOTIC ORAL) Take by mouth once daily. 5/11/21   Historical Provider, MD   lisinopril 20 mg tablet Take 1 tablet (20 mg) by mouth once daily. 8/16/24   Dez Person MD   magnesium gluconate (Magonate) 27.5 mg magne- sium (500 mg) tablet Take 500 mg by mouth 2 times a day. 5/11/21   Historical Provider, MD   NON FORMULARY Elderberry 500 MG as directed Orally    Historical Provider, MD   omega 3-dha-epa-fish oil (Fish OiL) 1,200 (144-216) mg capsule Take 2 capsules (2,400 mg) by mouth once daily.    Historical Provider, MD   omeprazole (PriLOSEC) 20 mg DR capsule Take 1 capsule (20 mg) by mouth once daily. Do not crush or chew. 12/19/24 12/19/25  Dez Person MD   Orencia ClickJect 125 mg/mL auto-injector auto-injection  8/29/23   Historical Provider, MD   POTASSIUM ORAL Take 99 mg by mouth once daily. for 30 day(s)    Historical Provider, INGE Castellanos, 15 mg/0.3 mL auto-injector  7/20/23   Historical Provider, MD    rosuvastatin (Crestor) 20 mg tablet TAKE 1 TABLET BY MOUTH EVERY DAY 11/22/24   Dez Person MD   torsemide (Demadex) 10 mg tablet Take 1 tablet (10 mg) by mouth once daily. 3/20/24   Dez Person MD   traZODone (Desyrel) 300 mg tablet Take 1 tablet (300 mg) by mouth once daily at bedtime.  for 30 day(s) 5/23/18   Historical Provider, MD   vitamin B complex (SUPER B-50 COMPLEX ORAL) Super B Complex    Historical Provider, MD   zinc gluconate 50 mg tablet Take 1 tablet (50 mg) by mouth once daily.  for 30 day(s) 5/11/21   Historical Provider, MD ANTONIETA FUNG Physical Exam     Airway    Testing/Diagnostic:   MRI LUMBAR SPINE; 5/24/24   IMPRESSION:   No substantial residual canal stenosis following L2-L5 dorsal   decompression with posterior farhana and pedicle screw fixation.   Residual degenerative foraminal stenosis is most pronounced and mild to   moderate on the left at L4-L5.   Moderate degenerative foraminal stenosis on the right at T11-T12.   Anatomic Lumbar Variant: None.  L4-5 is considered the level of the iliac   crest and assume there are 5 lumbar-type vertebrae.     CT LUMBAR SPINE; 5/24/24  IMPRESSION:   Chronic postsurgical changes of L2 L5 laminectomy with posterior farhana and   pedicle screw fixation.  No evidence of mature osseous fusion across the   L2-L3 or L4-L5 disc spaces.  Faint lucency surrounding the bilateral L5   pedicle screws may reflect loosening and/or abnormal motion.   No substantial residual canal stenosis identified.  Foraminal stenosis is   fully characterized on MRI performed the same day.   Anatomic Lumbar Variant: None.  L4-5 is considered the level of the iliac   crest and assume there are 5 lumbar-type vertebrae.     Cardiac Cath; 9/15/22 CT-guided lung biopsy of RLL     Patient Specialist/PCP:   PCP: Dez Person MD 2/5/25    Oncology: SHARIF Preston-CNP 11/14/24 Management of newly diagnosed working stage IA3  ([id number]/[id number] ) squamous cell lung  cancer.      Neurosurgery: Luther Marquez MD 11/4/24 referred from Dr. Sparks for lumbar spondylosis without myelopathy. Patient has hx of chronic low back pain s/p L2-L5 lumbar fusion. She presents today to discuss permanent placement of spinal cord stimulation.     Pain Management: Real Vazquez MD at [facility] 7/23/24 presents with: Back Pain: Upper and low back normally follows with Dr. Melara.    Rheumatology: Betsy Prabhakar MD  Visit Vitals  OB Status Oopherectomy   Smoking Status Former       DASI Risk Score    No data to display       Caprini DVT Assessment    No data to display       Modified Frailty Index    No data to display       CHADS2 Stroke Risk  Current as of 10 minutes ago        N/A 3 to 100%: High Risk   2 to < 3%: Medium Risk   0 to < 2%: Low Risk     Last Change: N/A          This score determines the patient's risk of having a stroke if the patient has atrial fibrillation.        This score is not applicable to this patient. Components are not calculated.          Revised Cardiac Risk Index    No data to display       Apfel Simplified Score    No data to display       Risk Analysis Index Results This Encounter    No data found in the last 10 encounters.       Prodigy: High Risk  Total Score: 12              Prodigy Age Score           ARISCAT Score for Postoperative Pulmonary Complications    No data to display       Robbins Perioperative Risk for Myocardial Infarction or Cardiac Arrest (MENDEZ)    No data to display         Assessment and Plan:    ONLY    Ruba Bañuelos RN  Pre-Admission Testing   {[facility] EMBEDDED ASSESSMENT AND PLAN:84822}

## 2025-03-04 ENCOUNTER — OFFICE VISIT (OUTPATIENT)
Dept: HEMATOLOGY/ONCOLOGY | Facility: CLINIC | Age: 75
End: 2025-03-04
Payer: MEDICARE

## 2025-03-04 ENCOUNTER — TELEPHONE (OUTPATIENT)
Dept: HEMATOLOGY/ONCOLOGY | Facility: CLINIC | Age: 75
End: 2025-03-04

## 2025-03-04 VITALS
BODY MASS INDEX: 31.76 KG/M2 | HEART RATE: 71 BPM | SYSTOLIC BLOOD PRESSURE: 156 MMHG | RESPIRATION RATE: 18 BRPM | OXYGEN SATURATION: 98 % | WEIGHT: 208.89 LBS | TEMPERATURE: 97.3 F | DIASTOLIC BLOOD PRESSURE: 90 MMHG

## 2025-03-04 DIAGNOSIS — R93.89 ABNORMAL CT SCAN, CHEST: ICD-10-CM

## 2025-03-04 DIAGNOSIS — C34.31 MALIGNANT NEOPLASM OF LOWER LOBE OF RIGHT LUNG (MULTI): Primary | ICD-10-CM

## 2025-03-04 PROCEDURE — 1157F ADVNC CARE PLAN IN RCRD: CPT | Performed by: NURSE PRACTITIONER

## 2025-03-04 PROCEDURE — 3077F SYST BP >= 140 MM HG: CPT | Performed by: NURSE PRACTITIONER

## 2025-03-04 PROCEDURE — 1159F MED LIST DOCD IN RCRD: CPT | Performed by: NURSE PRACTITIONER

## 2025-03-04 PROCEDURE — 1125F AMNT PAIN NOTED PAIN PRSNT: CPT | Performed by: NURSE PRACTITIONER

## 2025-03-04 PROCEDURE — 99213 OFFICE O/P EST LOW 20 MIN: CPT | Performed by: NURSE PRACTITIONER

## 2025-03-04 PROCEDURE — 3080F DIAST BP >= 90 MM HG: CPT | Performed by: NURSE PRACTITIONER

## 2025-03-04 PROCEDURE — 1123F ACP DISCUSS/DSCN MKR DOCD: CPT | Performed by: NURSE PRACTITIONER

## 2025-03-04 ASSESSMENT — PAIN SCALES - GENERAL: PAINLEVEL_OUTOF10: 8

## 2025-03-04 NOTE — PROGRESS NOTES
Patient here for follow up visit Malignant neoplasm RLL  S/P wedge resection in 2022.  Patient of Dr Bishop. Currently under surveillance. Here to review recent CT.      Chronic back and joint pain. CT Complete 2/27/25.     Patient here alone    Medications and Allergies reviewed and reconciled this visit.    Follow up per MD request. Follow up in 6 months.    Patient reports availability and use of mychart, Reviewed this is a good place to communicate with the team as well as review labs and upcoming orders.      No barriers to education noted, patient agrees to current plan and verbalized understanding using teach back method.

## 2025-03-04 NOTE — TELEPHONE ENCOUNTER
Call placed to Lorraine per Luis M Allison's request. Patients current PCP is retiring she was looking for another near by.   Luis M suggested     Dr Mack Tamayo at   [phone]    She is interested in looking into this doctor and took his name and info.

## 2025-03-04 NOTE — PROGRESS NOTES
Subjective:   Patient Name: Lorraine Iniguez    : 1950     MRN: [id number]     Age: 74 y.o.     Gender: female  Referring Provider: ZENA  Pt number: [phone]    CC: Management of newly diagnosed working stage IA3  ([id number]/1c ) squamous cell lung cancer. PDL-1 2%. NGS not done due to insufficient tissue.  22: RLL wedge resection: NSCLC with predominantly squamous cell and components of glandular findings. Stage IA2 ([id number]), PDL-1 <1%. NGS KALYANI, TP53 p.[id number].      Presenting History (10/11/2022):  Ms Iniguez is a 72-year-old F, active smoker (0.5 pack per day for ~ 60 years, 30 py smoking) with PMHx of RA  (dx in late 30s, currently controlled with MTX and Abatacept) who is referred here for the management of newly diagnosed likely metastatic squamous cell lung cancer. She has been having long-standing  long nodules that potentially related to her RA, and it has been followed with serial scans. She had LLL nodule biopsy in 10/2017 which  showed necrotic tissue with granulation tissue proliferation, fibrosis and chronic active inflammation. She had another RLL CT guided biopsy in 2020 which again negative for malignancy. On the LDCT screening scan on 2022, she was found to have  multiple bilateral lung nodules. She underwent PET/CT on 2022 which showed multiple new pulmonary nodules are seen in bilateral  lungs suspicious for neoplasm. Metastatic disease cannot be excluded. No axillary, hilar or mediastinal adenopathy. Stable R hepatic lobe cyst measuring 14mm without radiotracer activity. Unchanged renal cyst. She underwent CT-guided lung biopsy of RLL  nodule on 09/15/2022 with pathology revealing invasive squamous cell carcinoma. IHC positive for p40, CK7, CK5/6 and some staining for TTF-1. Negative for synaptophysin, CK20, napsin A and chromogranin.   She presented to clinic today accompanied by her daughter.  She is overall doing well.  She endorses dyspnea on exertion  if walking for a long distance.  She also uses a cane if she needs to walk outside.  She has mild cough which was stable.  She denies  any hemoptysis, fever, chills, nausea, vomiting, diarrhea, headache, or visual change.  She does have chronic joint pain which varies.  She endorses left knee pain and R sided chest pain since the biopsy, which is well controlled by over-the-counter pain  medication.     Treatment Summary:   11/28/2022: RLL wedge resection (Dr. Shi)    Interval History (03/04/2025):    Patient presents for a follow up visit and scan review. On presentation today her primary complaint is chronic back and joint pains. She is followed by rheumatology for RA and pain management. She denies any fevers, chills or night sweats. No cough or chest pain. Occasional GAGE. No nausea or vomiting. No constipation or diarrhea. No urinary symptoms. No rash. No neuropathy.     She is scheduled for spinal cord stimulator placement on March 13, 2025.    Review of Systems:  A review of systems has been completed and are negative for complaints except what is stated in the HPI and/or past medical history      Medical History:   RA, insomnia    Surgical History:   Past Surgical History:   Procedure Laterality Date    BREAST BIOPSY Left     benign    CT GUIDED IMAGING FOR NEEDLE PLACEMENT  10/12/2017    CT GUIDED IMAGING FOR NEEDLE PLACEMENT [facility]    CT GUIDED IMAGING FOR NEEDLE PLACEMENT  07/10/2020    CT GUIDED IMAGING FOR NEEDLE PLACEMENT [facility]    CT GUIDED PERCUTANEOUS BIOPSY LUNG  11/30/2022    CT GUIDED PERCUTANEOUS BIOPSY LUNG [facility]       Family History:  No family history on file.    Allergies:  No Known Allergies      Meds (Current):    Current Outpatient Medications:     ascorbic acid (Vitamin C) 1,000 mg tablet, Take 1 tablet (1,000 mg) by mouth once daily.  for 30 day(s), Disp: , Rfl:     BABY ASPIRIN ORAL, Baby Aspirin, Disp: , Rfl:     calcium carbonate-vitamin D3  (Caltrate with Vitamin D3) 600 mg-20 mcg (800 unit) tablet, Take 1 tablet by mouth 2 times a day., Disp: , Rfl:     cetirizine (ZyrTEC) 10 mg tablet, Take 1 tablet (10 mg) by mouth once daily., Disp: , Rfl:     coenzyme Q-10 (Co Q-10) 200 mg capsule, Take 1 capsule (200 mg) by mouth once daily., Disp: , Rfl:     cyanocobalamin (Vitamin B-12) 1,000 mcg tablet, Take 0.5 tablets (500 mcg) by mouth once daily., Disp: , Rfl:     denosumab (PROLIA SUBQ), Inject 60 mg under the skin. q6 mo, Disp: , Rfl:     eszopiclone (Lunesta) 3 mg tablet, Take 1 tablet (3 mg) by mouth once daily at bedtime., Disp: , Rfl:     folic acid (Folvite) 1 mg tablet, Take 2 tablets (2 mg) by mouth once daily., Disp: , Rfl:     gabapentin (Neurontin) 300 mg capsule, Take 1 capsule (300 mg) by mouth 3 times a day., Disp: , Rfl:     glucosamine/chondr hayes A sod (OSTEO BI-FLEX ORAL), 0 Refill(s), Type: Maintenance, Disp: , Rfl:     hydroxychloroquine (Plaquenil) 200 mg tablet, Take 1 tablet (200 mg) by mouth once daily., Disp: , Rfl:     Lactobacil 2/Bifido 11/S.therm (HIGH POTENCY PROBIOTIC ORAL), Take by mouth once daily., Disp: , Rfl:     lisinopril 20 mg tablet, Take 1 tablet (20 mg) by mouth once daily., Disp: 90 tablet, Rfl: 3    magnesium gluconate (Magonate) 27.5 mg magne- sium (500 mg) tablet, Take 500 mg by mouth 2 times a day., Disp: , Rfl:     NON FORMULARY, Elderberry 500 MG as directed Orally, Disp: , Rfl:     omega 3-dha-epa-fish oil (Fish OiL) 1,200 (144-216) mg capsule, Take 2 capsules (2,400 mg) by mouth once daily., Disp: , Rfl:     omeprazole (PriLOSEC) 20 mg DR capsule, Take 1 capsule (20 mg) by mouth once daily. Do not crush or chew., Disp: 90 capsule, Rfl: 3    Orencia ClickJect 125 mg/mL auto-injector auto-injection, , Disp: , Rfl:     POTASSIUM ORAL, Take 99 mg by mouth once daily. for 30 day(s), Disp: , Rfl:     Rasuvo, PF, 15 mg/0.3 mL auto-injector, , Disp: , Rfl:     rosuvastatin (Crestor) 20 mg tablet, TAKE 1 TABLET BY  MOUTH EVERY DAY, Disp: 90 tablet, Rfl: 1    torsemide (Demadex) 10 mg tablet, Take 1 tablet (10 mg) by mouth once daily., Disp: 90 tablet, Rfl: 3    traZODone (Desyrel) 300 mg tablet, Take 1 tablet (300 mg) by mouth once daily at bedtime.  for 30 day(s), Disp: , Rfl:     vitamin B complex (SUPER B-50 COMPLEX ORAL), Super B Complex, Disp: , Rfl:     zinc gluconate 50 mg tablet, Take 1 tablet (50 mg) by mouth once daily.  for 30 day(s), Disp: , Rfl:       Performance Status (ECOG): 1    ECOG Definition  0 Fully active; no performance restrictions.  1 Strenuous physical activity restricted; fully ambulatory and able to carry out light work.  2 Capable of all self-care but unable to carry out any work activities. Up and about >50% of waking hours.  3 Capable of only limited self-care; confined to bed or chair >50% of waking hours.  4 Completely disabled; cannot carry out any self-care; totally confined to bed or chair.    Vital Signs:  /90 (BP Location: Left arm, Patient Position: Sitting, BP Cuff Size: Adult long)   Pulse 71   Temp 36.3 °C (97.3 °F) (Temporal)   Resp 18   Wt 94.7 kg (208 lb 14.2 oz) Comment: patient would not take off shoes  SpO2 98%   BMI 31.76 kg/m²     Wt Readings from Last 5 Encounters:   25 94.7 kg (208 lb 14.2 oz)   25 92.5 kg (204 lb)   25 92.5 kg (204 lb)   24 93.9 kg (207 lb)   24 95.1 kg (209 lb 10.5 oz)       Physical Exam:  ECO- RA  Pain: chronic RA and back pain  Constitutional: Well developed, awake/alert/oriented x3, no distress, alert and cooperative  Eyes: PER. sclera anicteric  ENMT: Oral mucosa moist, no lesions or thrush identified  Respiratory/Thorax: Breathing is non-labored. Lungs are clear to auscultation bilaterally. No adventitious breath sounds  Cardiovascular: S1-S2. Regular rate and rhythm. No murmurs, rubs, or gallops appreciated  Gastrointestinal: Abdomen soft nontender, nondistended, normal active bowel  sounds.  Musculoskeletal: ROM intact, no joint swelling, normal strength  Extremities: normal extremities, no cyanosis, no edema, no clubbing  Lymphatics: no palpable lymphadenopathy   Skin: no rash  Neurologic: alert and oriented x3. Nonfocal exam. No myoclonus  Psychological: Pleasant, appropriate and easily engaged     Labs:     No new labs      Imaging: Reviewed image with patient   CT Chest 2025  IMPRESSION:  1. Postoperative change, as above.  2.  Subpleural fibrotic changes in the lungs bilaterally, similar to the prior study.  3. Mild patchy airspace consolidation in the right lower lobe, slightly increased from the prior study, as above. Clinical correlation and continued follow-up until clearing is recommended.     Assessment/Plan:   74 y.o. female RA who is referred here for the management of newly diagnosed  working stage IV metastatic squamous cell lung cancer. PDL-1 2%; NGS: insufficient tissue. She had RLL wedge resection with Dr. Shi  on 2022, [id number]. Doing well so far.      Plan:    The patient's records and imaging have been reviewed in detail.  I have discussed with the patient the natural history of their disease  at length.  The following has also been discussed with the patient:     # Working stage IA2 ([id number])) squamous cell lung cancer, PDL-1 2%. She underwent  RLL wedge resection with Dr. Shi on 22 with pathology revealing invasive NSCLC predominantly squamous cell features and focal glandular components tumor measure 1.6cm. All LN including 7, 8 10R were negative (0/10-11). No LVI or VPI. Margins  were negative. PDL-1 <1%. NGS KALYANI, TP53 p.[id number]. We discussed that given the small size of her tumor, no adjuvant chemotherapy is indicated in this setting. We will continue with surveillance scan every 6 months for 2-3 years and annually.                 - Interval Imagin2025: CT chest (-)   IMPRESSION:  1. Postoperative change, as above.  2.  Subpleural  fibrotic changes in the lungs bilaterally, similar to the prior study.  3.. Mild patchy airspace consolidation in the right lower lobe, slightly increased from the prior study, as above. Clinical correlation and continued follow-up until clearing is recommended.     # RA: follows with Dr. Betsy Prabhakar [phone].      # Clinical Trials:  None currently.      # Access: Peripheral veins.     # Pain: Pain due to RA. Had recent flare. Currently on tramadol as needed. Pending orthopedic appt. Will refer to integrative medicine for uncontrolled back pain and knee pain.      # Bone Health: No signs of bony disease.     # Psychosocial: Coping well, no acute issues.  Good support from family & friends.       # GI/CINV: No acute issues.  Eating and voiding well.       # Smoking: Former smoker, quitted in 2022     # Fertility: N/A.  Oncofertility support available as needed.       # Heme/ESAs: N/A.     # GOC: Patient is aware of curative intent goals of care.     # Language: English.     # Dispo:   RTC in 6 months with scans prior      SHAIRF Preston-CNP  [facility]  Thoracic + H&N Medical Oncology     I spent a total of 30+ minutes on the date of the service which included preparing to see the patient, face-to-face patient care, completing clinical documentation, obtaining and / or reviewing separately obtained history, counseling and educating the patient/family/caregiver, ordering medications, tests, or procedures, communicating with other healthcare providers (not separately reported), independently interpreting results, not separately reported, and communicating results to the patient/family/caregiver, Name and date of birth verified.

## 2025-03-05 ENCOUNTER — PRE-ADMISSION TESTING (OUTPATIENT)
Dept: PREADMISSION TESTING | Facility: HOSPITAL | Age: 75
End: 2025-03-05
Payer: MEDICARE

## 2025-03-05 VITALS
TEMPERATURE: 98.2 F | HEIGHT: 68 IN | WEIGHT: 208 LBS | OXYGEN SATURATION: 97 % | HEART RATE: 80 BPM | BODY MASS INDEX: 31.52 KG/M2 | SYSTOLIC BLOOD PRESSURE: 147 MMHG | RESPIRATION RATE: 18 BRPM | DIASTOLIC BLOOD PRESSURE: 82 MMHG

## 2025-03-05 DIAGNOSIS — I10 ESSENTIAL (PRIMARY) HYPERTENSION: ICD-10-CM

## 2025-03-05 DIAGNOSIS — M96.1 POSTLAMINECTOMY SYNDROME OF LUMBAR REGION: Primary | ICD-10-CM

## 2025-03-05 LAB
ABO GROUP (TYPE) IN BLOOD: NORMAL
ANION GAP SERPL CALC-SCNC: 14 MMOL/L (ref 10–20)
ANTIBODY SCREEN: NORMAL
BUN SERPL-MCNC: 13 MG/DL (ref 6–23)
CALCIUM SERPL-MCNC: 9.3 MG/DL (ref 8.6–10.6)
CHLORIDE SERPL-SCNC: 103 MMOL/L (ref 98–107)
CO2 SERPL-SCNC: 26 MMOL/L (ref 21–32)
CREAT SERPL-MCNC: 0.81 MG/DL (ref 0.5–1.05)
EGFRCR SERPLBLD CKD-EPI 2021: 76 ML/MIN/1.73M*2
ERYTHROCYTE [DISTWIDTH] IN BLOOD BY AUTOMATED COUNT: 13 % (ref 11.5–14.5)
GLUCOSE SERPL-MCNC: 93 MG/DL (ref 74–99)
HCT VFR BLD AUTO: 41.5 % (ref 36–46)
HGB BLD-MCNC: 13.3 G/DL (ref 12–16)
MCH RBC QN AUTO: 30.1 PG (ref 26–34)
MCHC RBC AUTO-ENTMCNC: 32 G/DL (ref 32–36)
MCV RBC AUTO: 94 FL (ref 80–100)
NRBC BLD-RTO: 0 /100 WBCS (ref 0–0)
PLATELET # BLD AUTO: 196 X10*3/UL (ref 150–450)
POTASSIUM SERPL-SCNC: 4.2 MMOL/L (ref 3.5–5.3)
RBC # BLD AUTO: 4.42 X10*6/UL (ref 4–5.2)
RH FACTOR (ANTIGEN D): NORMAL
SODIUM SERPL-SCNC: 139 MMOL/L (ref 136–145)
WBC # BLD AUTO: 5.8 X10*3/UL (ref 4.4–11.3)

## 2025-03-05 PROCEDURE — 99204 OFFICE O/P NEW MOD 45 MIN: CPT | Performed by: NURSE PRACTITIONER

## 2025-03-05 PROCEDURE — 87081 CULTURE SCREEN ONLY: CPT

## 2025-03-05 PROCEDURE — 80048 BASIC METABOLIC PNL TOTAL CA: CPT

## 2025-03-05 PROCEDURE — 86901 BLOOD TYPING SEROLOGIC RH(D): CPT

## 2025-03-05 PROCEDURE — 93005 ELECTROCARDIOGRAM TRACING: CPT

## 2025-03-05 PROCEDURE — 85027 COMPLETE CBC AUTOMATED: CPT

## 2025-03-05 PROCEDURE — 36415 COLL VENOUS BLD VENIPUNCTURE: CPT

## 2025-03-05 RX ORDER — CHLORHEXIDINE GLUCONATE 40 MG/ML
SOLUTION TOPICAL DAILY PRN
Qty: 473 ML | Refills: 0 | Status: SHIPPED | OUTPATIENT
Start: 2025-03-05 | End: 2025-03-10

## 2025-03-05 RX ORDER — TORSEMIDE 10 MG/1
10 TABLET ORAL DAILY
Qty: 90 TABLET | Refills: 3 | Status: SHIPPED | OUTPATIENT
Start: 2025-03-05

## 2025-03-05 RX ORDER — CHLORHEXIDINE GLUCONATE ORAL RINSE 1.2 MG/ML
15 SOLUTION DENTAL AS NEEDED
Qty: 473 ML | Refills: 0 | Status: SHIPPED | OUTPATIENT
Start: 2025-03-05 | End: 2025-03-07

## 2025-03-05 ASSESSMENT — DUKE ACTIVITY SCORE INDEX (DASI)
CAN YOU DO HEAVY WORK AROUND THE HOUSE LIKE SCRUBBING FLOORS OR LIFTING AND MOVING HEAVY FURNITURE: YES
CAN YOU DO YARD WORK LIKE RAKING LEAVES, WEEDING OR PUSHING A MOWER: YES
CAN YOU HAVE SEXUAL RELATIONS: NO
CAN YOU RUN A SHORT DISTANCE: NO
CAN YOU DO LIGHT WORK AROUND THE HOUSE LIKE DUSTING OR WASHING DISHES: YES
CAN YOU PARTICIPATE IN STRENOUS SPORTS LIKE SWIMMING, SINGLES TENNIS, FOOTBALL, BASKETBALL, OR SKIING: NO
CAN YOU WALK A BLOCK OR TWO ON LEVEL GROUND: NO
CAN YOU TAKE CARE OF YOURSELF (EAT, DRESS, BATHE, OR USE TOILET): YES
CAN YOU CLIMB A FLIGHT OF STAIRS OR WALK UP A HILL: NO
TOTAL_SCORE: 23.2
CAN YOU WALK INDOORS, SUCH AS AROUND YOUR HOUSE: YES
CAN YOU DO MODERATE WORK AROUND THE HOUSE LIKE VACUUMING, SWEEPING FLOORS OR CARRYING GROCERIES: YES
CAN YOU PARTICIPATE IN MODERATE RECREATIONAL ACTIVITIES LIKE GOLF, BOWLING, DANCING, DOUBLES TENNIS OR THROWING A BASEBALL OR FOOTBALL: NO
DASI METS SCORE: 5.6

## 2025-03-05 ASSESSMENT — ENCOUNTER SYMPTOMS
MYALGIAS: 1
CONSTITUTIONAL NEGATIVE: 1
GASTROINTESTINAL NEGATIVE: 1
EYES NEGATIVE: 1
RESPIRATORY NEGATIVE: 1
CARDIOVASCULAR NEGATIVE: 1
NECK NEGATIVE: 1
ENDOCRINE NEGATIVE: 1
NEUROLOGICAL NEGATIVE: 1
ARTHRALGIAS: 1

## 2025-03-05 ASSESSMENT — LIFESTYLE VARIABLES: SMOKING_STATUS: NONSMOKER

## 2025-03-05 NOTE — CPM/PAT H&P
CPM/PAT Evaluation       Name: Lorraine Iniguez (Lorraine Iniguez)  /Age: 1950/74 y.o.     Visit Type:   In-Person       Chief Complaint: Postlaminectomy syndrome of lumbar region    HPI  Pt is scheduled a 74 year old female with Postlaminectomy syndrome of the lumbar region. Pt is being evaluated in CPM in anticipation of a Thoracic Lami for Paddle Spinal Cord Stimulator Electrode and Generator Implant (VouchAR) with Dr. Marquez on 3/13/25.  Past Medical History:   Diagnosis Date    Chronic pain disorder     s/p L2-L5 lumbar fusion    Coronary artery disease     Hypertension     Lumbar spondylosis with myelopathy     Olecranon bursitis     Osteoporosis     Postlaminectomy syndrome of lumbar region     RA (rheumatoid arthritis)     Scoliosis     Squamous cell lung cancer (Multi)     s/p RLL wedge resection 22       Past Surgical History:   Procedure Laterality Date    BREAST BIOPSY Left 40years    benign    COLONOSCOPY      CT GUIDED IMAGING FOR NEEDLE PLACEMENT  10/12/2017    CT GUIDED IMAGING FOR NEEDLE PLACEMENT [facility]    CT GUIDED IMAGING FOR NEEDLE PLACEMENT  07/10/2020    CT GUIDED IMAGING FOR NEEDLE PLACEMENT [facility]    CT GUIDED PERCUTANEOUS BIOPSY LUNG  2022    CT GUIDED PERCUTANEOUS BIOPSY LUNG [facility]    HYSTERECTOMY  1995    LUMBAR FUSION  2016    s/p L2-L5 lumbar fusion    LUNG REMOVAL, PARTIAL  2022    RLL wedge resection       Patient  has no history on file for sexual activity.    No family history on file.    No Known Allergies    Prior to Admission medications    Medication Sig Start Date End Date Taking? Authorizing Provider   ascorbic acid (Vitamin C) 1,000 mg tablet Take 1 tablet (1,000 mg) by mouth once daily.  for 30 day(s)    Historical Provider, MD   BABY ASPIRIN ORAL Baby Aspirin    Historical Provider, MD   calcium carbonate-vitamin D3 (Caltrate with Vitamin D3) 600 mg-20 mcg (800 unit) tablet Take 1  tablet by mouth 2 times a day. 5/11/21   Historical Provider, MD   cetirizine (ZyrTEC) 10 mg tablet Take 1 tablet (10 mg) by mouth once daily. 5/23/18   Historical Provider, MD   coenzyme Q-10 (Co Q-10) 200 mg capsule Take 1 capsule (200 mg) by mouth once daily. 5/11/21   Historical Provider, MD   cyanocobalamin (Vitamin B-12) 1,000 mcg tablet Take 0.5 tablets (500 mcg) by mouth once daily. 5/11/21   Historical Provider, MD   denosumab (PROLIA SUBQ) Inject 60 mg under the skin. q6 mo 5/11/21   Historical Provider, MD   eszopiclone (Lunesta) 3 mg tablet Take 1 tablet (3 mg) by mouth once daily at bedtime.    Historical Provider, MD   folic acid (Folvite) 1 mg tablet Take 2 tablets (2 mg) by mouth once daily.    Historical Provider, MD   gabapentin (Neurontin) 300 mg capsule Take 1 capsule (300 mg) by mouth 3 times a day. 8/24/15   Historical Provider, MD   glucosamine/chondr hayes A sod (OSTEO BI-FLEX ORAL) 0 Refill(s), Type: Maintenance 5/11/21   Historical Provider, MD   hydroxychloroquine (Plaquenil) 200 mg tablet Take 1 tablet (200 mg) by mouth once daily.    Historical Provider, MD   Lactobacil 2/Bifido 11/S.therm (HIGH POTENCY PROBIOTIC ORAL) Take by mouth once daily. 5/11/21   Historical Provider, MD   lisinopril 20 mg tablet Take 1 tablet (20 mg) by mouth once daily. 8/16/24   Dez Person MD   magnesium gluconate (Magonate) 27.5 mg magne- sium (500 mg) tablet Take 500 mg by mouth 2 times a day. 5/11/21   Historical Provider, MD   NON FORMULARY Elderberry 500 MG as directed Orally    Historical Provider, MD   omega 3-dha-epa-fish oil (Fish OiL) 1,200 (144-216) mg capsule Take 2 capsules (2,400 mg) by mouth once daily.    Historical Provider, MD   omeprazole (PriLOSEC) 20 mg DR capsule Take 1 capsule (20 mg) by mouth once daily. Do not crush or chew. 12/19/24 12/19/25  Dez Person MD   Orencia ClickJect 125 mg/mL auto-injector auto-injection  8/29/23   Historical Provider, MD   POTASSIUM ORAL Take 99  mg by mouth once daily. for 30 day(s)    Historical Provider, MD Keane, PF, 15 mg/0.3 mL auto-injector  7/20/23   Historical Provider, MD   rosuvastatin (Crestor) 20 mg tablet TAKE 1 TABLET BY MOUTH EVERY DAY 11/22/24   Dez Person MD   torsemide (Demadex) 10 mg tablet Take 1 tablet (10 mg) by mouth once daily. 3/20/24   Dez Person MD   traZODone (Desyrel) 300 mg tablet Take 1 tablet (300 mg) by mouth once daily at bedtime.  for 30 day(s) 5/23/18   Historical Provider, MD   vitamin B complex (SUPER B-50 COMPLEX ORAL) Super B Complex    Historical Provider, MD   zinc gluconate 50 mg tablet Take 1 tablet (50 mg) by mouth once daily.  for 30 day(s) 5/11/21   Historical Provider, MD FUNG ROS:   Constitutional:   neg    Neuro/Psych:   neg    Eyes:   neg    Ears:    hearing loss   hearing aides  Nose:   neg    Mouth:   neg    Throat:   neg    Neck:   neg    Cardio:   neg    Respiratory:   neg    Endocrine:   neg    GI:   neg    :   neg    Musculoskeletal:    Back pain 8/10   arthralgias   myalgias  Hematologic:   neg    Skin:  neg        Physical Exam  Vitals reviewed.   Constitutional:       Appearance: Normal appearance.   HENT:      Head: Normocephalic and atraumatic.      Nose: Nose normal.      Mouth/Throat:      Mouth: Mucous membranes are moist.   Cardiovascular:      Rate and Rhythm: Normal rate and regular rhythm.      Pulses: Normal pulses.      Heart sounds: Normal heart sounds.   Pulmonary:      Effort: Pulmonary effort is normal.      Breath sounds: Normal breath sounds.   Abdominal:      Palpations: Abdomen is soft.   Musculoskeletal:         General: Normal range of motion.      Cervical back: Normal range of motion.      Comments: Ambulates with cane   Skin:     General: Skin is warm.   Neurological:      General: No focal deficit present.      Mental Status: She is alert and oriented to person, place, and time.   Psychiatric:         Mood and Affect: Mood normal.          "Behavior: Behavior normal.          Airway    Testing/Diagnostic:   MRI LUMBAR SPINE; 5/24/24   IMPRESSION:   No substantial residual canal stenosis following L2-L5 dorsal   decompression with posterior farhana and pedicle screw fixation.   Residual degenerative foraminal stenosis is most pronounced and mild to   moderate on the left at L4-L5.   Moderate degenerative foraminal stenosis on the right at T11-T12.   Anatomic Lumbar Variant: None.  L4-5 is considered the level of the iliac   crest and assume there are 5 lumbar-type vertebrae.     CT LUMBAR SPINE; 5/24/24  IMPRESSION:   Chronic postsurgical changes of L2 L5 laminectomy with posterior farhana and   pedicle screw fixation.  No evidence of mature osseous fusion across the   L2-L3 or L4-L5 disc spaces.  Faint lucency surrounding the bilateral L5   pedicle screws may reflect loosening and/or abnormal motion.   No substantial residual canal stenosis identified.  Foraminal stenosis is   fully characterized on MRI performed the same day.   Anatomic Lumbar Variant: None.  L4-5 is considered the level of the iliac   crest and assume there are 5 lumbar-type vertebrae.       Patient Specialist/PCP:   PCP: Dez Person MD 2/5/25    Oncology: Luis M Allison APRN-CNP 11/14/24 Management of newly diagnosed working stage IA3  ([id number]/1c ) squamous cell lung cancer.      Neurosurgery: Luther Marquez MD 11/4/24 referred from Dr. Sparks for lumbar spondylosis without myelopathy. Patient has hx of chronic low back pain s/p L2-L5 lumbar fusion. She presents today to discuss permanent placement of spinal cord stimulation.     Pain Management: Real V[facility]quez MD [facility] 7/23/24 presents with: Back Pain: Upper and low back normally follows with Dr. Melara.    Rheumatology: Betsy Prabhakar MD  Visit Vitals  /82   Pulse 80   Temp 36.8 °C (98.2 °F) (Oral)   Ht 1.727 m (5' 8\")   Wt 94.3 kg (208 lb)   SpO2 97%   BMI 31.63 kg/m²   OB Status Oopherectomy   Smoking Status Former   BSA " 2.13 m²       DASI Risk Score      Flowsheet Row Pre-Admission Testing from 3/5/2025 in [facility]   Can you take care of yourself (eat, dress, bathe, or use toilet)?  2.75 filed at 03/05/2025 1308   Can you walk indoors, such as around your house? 1.75 filed at 03/05/2025 1308   Can you walk a block or two on level ground?  0 filed at 03/05/2025 1308   Can you climb a flight of stairs or walk up a hill? 0 filed at 03/05/2025 1308   Can you run a short distance? 0 filed at 03/05/2025 1308   Can you do light work around the house like dusting or washing dishes? 2.7 filed at 03/05/2025 1308   Can you do moderate work around the house like vacuuming, sweeping floors or carrying groceries? 3.5 filed at 03/05/2025 1308   Can you do heavy work around the house like scrubbing floors or lifting and moving heavy furniture?  8 filed at 03/05/2025 1308   Can you do yard work like raking leaves, weeding or pushing a mower? 4.5 filed at 03/05/2025 1308   Can you have sexual relations? 0 filed at 03/05/2025 1308   Can you participate in moderate recreational activities like golf, bowling, dancing, doubles tennis or throwing a baseball or football? 0 filed at 03/05/2025 1308   Can you participate in strenous sports like swimming, singles tennis, football, basketball, or skiing? 0 filed at 03/05/2025 1308   DASI SCORE 23.2 filed at 03/05/2025 1308   METS Score (Will be calculated only when all the questions are answered) 5.6 filed at 03/05/2025 1308          Caprini DVT Assessment    No data to display       Modified Frailty Index    No data to display       [id number] Stroke Risk Points  Current as of just now        N/A 0 to 9 Points:      Last Change: N/A          The [id number] risk score (Lip BIMAL, et al. 2009. © 2010 [employer]) quantifies the risk of stroke for a patient with atrial fibrillation. For patients without atrial fibrillation or under the age of 18 this score appears as  N/A. Higher score values generally indicate higher risk of stroke.        This score is not applicable to this patient. Components are not calculated.          Revised Cardiac Risk Index    No data to display       Apfel Simplified Score    No data to display       Risk Analysis Index Results This Encounter    No data found in the last 10 encounters.       Stop Bang Score      Flowsheet Row Pre-Admission Testing from 3/5/2025 in [facility]   Do you snore loudly? 0 filed at 03/05/2025 1307   Do you often feel tired or fatigued after your sleep? 0 filed at 03/05/2025 1307   Has anyone ever observed you stop breathing in your sleep? 0 filed at 03/05/2025 1307   Do you have or are you being treated for high blood pressure? 1 filed at 03/05/2025 1307   Recent BMI (Calculated) 31 filed at 03/05/2025 1307   Is BMI greater than 35 kg/m2? 0=No filed at 03/05/2025 1307   Age older than 50 years old? 1=Yes filed at 03/05/2025 1307   Is your neck circumference greater than 17 inches (Male) or 16 inches (Female)? 0 filed at 03/05/2025 1307   Gender - Male 0=No filed at 03/05/2025 1307   STOP-BANG Total Score 2 filed at 03/05/2025 1307          Prodigy: High Risk  Total Score: 12              Prodigy Age Score           ARISCAT Score for Postoperative Pulmonary Complications    No data to display       Robbins Perioperative Risk for Myocardial Infarction or Cardiac Arrest (MENDEZ)    No data to display         Assessment and Plan:     Anesthesia  The patient denies problems with anesthesia in the past such as PONV, prolonged sedation, awareness, dental damage, aspiration, cardiac arrest, difficult intubation, or unexpected hospital admissions.     Neurology  The patient has postlaminectomy syndrome, and scoliosis. The patient is at increased risk for postoperative delirium secondary to age 65 or older, sensory Impairment, decreased functional status. The patient is at increased risk for perioperative stroke secondary  to hypertension , increased age, female gender, general anesthesia, operative time >2.5 hours. Handouts for preoperative brain exercises given to patient.    HEENT/Airway  No diagnoses, significant findings on chart review, clinical presentation, or evaluation. No documented or reported history of airway difficulty.     Cardiovascular  The patient is scheduled for non-cardiac surgery associated with elevated risk. The patient has no major cardiac contraindications to non- cardiac surgery.  RCRI  The patient meets 0 RCRI criteria and therefor has a 3.9% risk of major adverse cardiac complications.  METS  The patient's functional capacity is greater than 4 METS.  EKG  The patient has no EKG or echocardiographic changes concerning for myocardial ischemia.   Heart Failure  The patient has no known history of heart failure.  Additionally, the patient reports no symptoms of heart failure and demonstrates no signs of heart failure.  Hypertension Evaluation  The patient has a known history of hypertension that is controlled.  Patient's hypertension is most consistent with stage 1.  Heart Rhythm Evaluation  The patient has no history of arrhythmias.  Heart Valve Evaluation  The patient has no known history of valvular heart disease. The patient has no symptoms or physical exam findings to suggest valvular heart disease.  Cardiology Evaluation  The patient is not followed by cardiology.    The patient has a 30-day risk for MACE of 0 predictors, 3.9% risk for cardiac death, nonfatal myocardial infarction, and nonfatal cardiac arrest.  MENDEZ score which indicates a 0.1% risk of intraoperative or 30-day postoperative MACE (major adverse cardiac event).    Pulmonary  The patient has lung cancer s/p resection. The patient is at increased risk of perioperative pulmonary complications secondary to neurosurgery, advanced age greater than 60.    The patient has a stop bang score of 2, which places patient at low risk for having  AISLINN.    ARISCAT 26, Intermediate, 13.3% risk of in-hospital postoperative pulmonary complications  PRODIGY 12, intermediate risk of respiratory depression episode. Patient given PI sheet for preoperative deep breathing exercises.    Hematology  No diagnoses or significant findings on chart review or clinical presentation and evaluation.  Antiplatelet management   The patient is currently receiving antiplatelet therapy for CAD.  Anticoagulation management  The patient is not currently receiving anticoagulation therapy.    Caprini score 12, high risk of perioperative VTE.     Patient instructed to ambulate as soon as possible postoperatively to decrease thromboembolic risk. Initiate mechanical DVT prophylaxis as soon as possible and initiate chemical prophylaxis when deemed safe from a bleeding standpoint post surgery.     Transfusion Evaluation  A type and screen was obtained given the likelihood for perioperative transfusion of blood or blood products.    Gastrointestinal  The patient has GERD    Eat 10- 0,  self-perceived oropharyngeal dysphagia scale (0-40)     Genitourinary  No diagnoses or significant findings on chart review or clinical presentation and evaluation.    Renal  No renal diagnoses or significant findings on chart review or clinical presentation and evaluation. The patient has specific risk factors associated with increased risk of perioperative renal complications related to age greater than 55, hypertension.    Musculoskeletal  The patient has scoliosis, postlaminectomy syndrome    Endocrine  Diabetes Evaluation  The patient has no history of diabetes mellitus.  Thyroid Disease Evaluation  The patient has no history of thyroid disease.    ID  No diagnoses or significant findings on chart review or clinical presentation and evaluation. MRSA screening obtained. Prescriptions and instructions given for Hibiclens and Peridex.    -Preoperative medication instructions were provided and reviewed with the  patient.  Any additional testing or evaluation was explained to the patient.  NPO Instructions were discussed, and the patient's questions were answered prior to conclusion of this encounter. Patient verbalized understanding of preoperative instructions. After Visit Summary given.      Recent Results (from the past 24 hours)   Type And Screen Is this order related to pregnancy or an upcoming surgery? Yes; Where will this surgery/delivery be performed? [facility]; What is the date of the surgery? 3/13/2025; Has this patient ever had a transfusion? No; Has t...    Collection Time: 03/05/25  1:51 PM   Result Value Ref Range    ABO TYPE B     Rh TYPE NEG     ANTIBODY SCREEN NEG    CBC    Collection Time: 03/05/25  1:51 PM   Result Value Ref Range    WBC 5.8 4.4 - 11.3 x10*3/uL    nRBC 0.0 0.0 - 0.0 /100 WBCs    RBC 4.42 4.00 - 5.20 x10*6/uL    Hemoglobin 13.3 12.0 - 16.0 g/dL    Hematocrit 41.5 36.0 - 46.0 %    MCV 94 80 - 100 fL    MCH 30.1 26.0 - 34.0 pg    MCHC 32.0 32.0 - 36.0 g/dL    RDW 13.0 11.5 - 14.5 %    Platelets 196 150 - 450 x10*3/uL   Basic Metabolic Panel    Collection Time: 03/05/25  1:51 PM   Result Value Ref Range    Glucose 93 74 - 99 mg/dL    Sodium 139 136 - 145 mmol/L    Potassium 4.2 3.5 - 5.3 mmol/L    Chloride 103 98 - 107 mmol/L    Bicarbonate 26 21 - 32 mmol/L    Anion Gap 14 10 - 20 mmol/L    Urea Nitrogen 13 6 - 23 mg/dL    Creatinine 0.81 0.50 - 1.05 mg/dL    eGFR 76 >60 mL/min/1.73m*2    Calcium 9.3 8.6 - 10.6 mg/dL

## 2025-03-05 NOTE — PREPROCEDURE INSTRUCTIONS
Fasting Guidelines    NPO Instructions:    Do not eat any food after midnight the night before your surgery/procedure.  You may have up to TEN ounces of clear liquids until TWO hours before your instructed arrival time to the hospital. This includes water, black tea/coffee, (no milk or cream), apple juice, and/or electrolyte drinks (Gatorade).  You may chew gum up to TWO hours before your surgery/procedure.    Additional Instructions:     We have sent a prescription for Hibiclens soap and Peridex mouth wash to your preferred pharmacy.  If you have not already, Please  your prescription and start using as directed before surgery.  Follow the instruction sheet provided to you at your CPM/PAT appointment.    Avoid herbal supplements, multivitamins and NSAIDS (non-steroidal anti-inflammatory drugs) such as Advil, Aleve, Ibuprofen, Naproxen, Excedrin, Meloxicam or Celebrex for at least 7 days prior to surgery. May take Tylenol as needed.    Avoid tobacco and alcohol products for 24 hours prior to surgery.    CONTACT SURGEON'S OFFICE IF YOU DEVELOP:  * Fever = 100.4 F   * New respiratory symptoms (e.g. cough, shortness of breath, respiratory distress, sore throat)  * Recent loss of taste or smell  *Flu like symptoms such as headache, fatigue or gastrointestinal symptoms  * You develop any open sores, shingles, burning or painful urination   AND/OR:  * You no longer wish to have the surgery.  * Any other personal circumstances change that may lead to the need to cancel or defer this surgery.  *You were admitted to any hospital within one week of your planned procedure.    Seven/Six Days before Surgery:  Review your medication instructions, stop indicated medications    Day of Surgery:  Review your medication instructions, take indicated medications  Wear comfortable loose fitting clothing  Do not use moisturizers, creams, lotions or perfume  All jewelry and valuables should be left at home      [facility]  [phone]           Patient Information: Pre-Operative Infection Prevention Measures     Why did I have my nose, under my arms, and groin swabbed?  The purpose of the swab is to identify Staphylococcus aureus inside your nose or on your skin.  The swab was sent to the laboratory for culture.  A positive swab/culture for Staphylococcus aureus is called colonization or carriage.      What is Staphylococcus aureus?  Staphylococcus aureus, also known as “staph”, is a germ found on the skin or in the nose of healthy people.  Sometimes Staphylococcus aureus can get into the body and cause an infection.  This can be minor (such as pimples, boils, or other skin problems).  It might also be serious (such as a blood infection, pneumonia, or a surgical site infection).    What is Staphylococcus aureus colonization or carriage?  Colonization or carriage means that a person has the germ but is not sick from it.  These bacteria can be spread on the hands or when breathing or sneezing.    How is Staphylococcus aureus spread?  It is most often spread by close contact with a person or item that carries it.    What happens if my culture is positive for Staphylococcus aureus?  Your doctor/medical team will use this information to guide any antibiotic treatment which may be necessary.  Regardless of the culture results, we will clean the inside of your nose with a betadine swab just before you have your surgery.      Will I get an infection if I have Staphylococcus aureus in my nose or on my skin?  Anyone can get an infection with Staphylococcus aureus.  However, the best way to reduce your risk of infection is to follow the instructions provided to you for the use of your CHG soap and dental rinse.        Patient Information: Oral/Dental Rinse    What is oral/dental rinse?   It is a mouthwash. It is a way of cleaning the mouth with a germ-killing solution before your surgery.  The solution contains  chlorhexidine, commonly known as CHG.   It is used inside the mouth to kill a bacteria known as Staphylococcus aureus.  Let your doctor know if you are allergic to Chlorhexidine.    Why do I need to use CHG oral/dental rinse?  The CHG oral/dental rinse helps to kill a bacteria in your mouth known as Staphylococcus aureus.     This reduces the risk of infection at the surgical site.      Using your CHG oral/dental rinse  STEPS:  Use your CHG oral/dental rinse after you brush your teeth the night before (at bedtime) and the morning of your surgery.  Follow all directions on your prescription label.    Use the cap on the container to measure 15ml   Swish (gargle if you can) the mouthwash in your mouth for at least 30 seconds, (do not swallow) and spit out  After you use your CHG rinse, do not rinse your mouth with water, drink or eat.  Please refer to the prescription label for the appropriate time to resume oral intake      What side effects might I have using the CHG oral/dental rinse?  CHG rinse will stick to plaque on the teeth.  Brush and floss just before use.  Teeth brushing will help avoid staining of plaque during use.      Patient Information: Home Preoperative Antibacterial Shower      What is a home preoperative antibacterial shower?  This shower is a way of cleaning the skin with a germ-killing solution before surgery.  The solution contains chlorhexidine, commonly known as CHG.  CHG is a skin cleanser with germ-killing ability.  Let your doctor know if you are allergic to chlorhexidine.    Why do I need to take a preoperative antibacterial shower?  Skin is not sterile.  It is best to try to make your skin as free of germs as possible before surgery.  Proper cleansing with a germ-killing soap before surgery can lower the number of germs on your skin.  This helps to reduce the risk of infection at the surgical site.  Following the instructions listed below will help you prepare your skin for surgery.       How do I use the solution?  Steps:  Begin using your CHG soap 5 days before your scheduled surgery on ________________________.    First, wash and rinse your hair using the CHG soap. Keep CHG soap away from ear canals and eyes.  Rinse completely, do not condition.  Hair extensions should be removed.  Wash your face with your normal soap and rinse.    Apply the CHG solution to a clean wet washcloth.  Turn the water off or move away from the water spray to avoid premature rinsing of the CHG soap as you are applying.   Firmly lather your entire body from the neck down.  Do not use on your face.  Pay special attention to the area(s) where your incision(s) will be located unless they are on your face.  Avoid scrubbing your skin too hard.  The important point is to have the CHG soap sit on your skin for 3 minutes.    When the 3 minutes are up, turn on the water and rinse the CHG solution off your body completely.   DO NOT wash with regular soap after you have used the CHG soap solution  Pat yourself dry with a clean, freshly-laundered towel.  DO NOT apply powders, deodorants, or lotions.  Dress in clean, freshly laundered nightclothes.    Be sure to sleep with clean, freshly laundered sheets.  Be aware that CHG will cause stains on fabrics; if you wash them with bleach after use.  Rinse your washcloth and other linens that have contact with CHG completely.  Use only non-chlorine detergents to launder the items used.   The morning of surgery is the fifth day.  Repeat the above steps and dress in clean comfortable clothing     Whom should I contact if I have any questions regarding the use of CHG soap?  Call the [facility], [facility] at [phone] if you have any questions.               Preoperative Brain Exercises    What are brain exercises?  A brain exercise is any activity that engages your thinking (cognitive) skills.    What types of activities are  considered brain exercises?  Jigsaw puzzles, crossword puzzles, word jumble, memory games, word search, and many more.  Many can be found free online or on your phone via a mobile [email or web address].    Why should I do brain exercises before my surgery?  More recent research has shown brain exercise before surgery can lower the risk of postoperative delirium (confusion) which can be especially important for older adults.  Patients who did brain exercises for 5 to 10 hours the days before surgery, cut their risk of postoperative delirium in half up to 1 week after surgery.         [facility]    Preoperative Deep Breathing Exercises    Why it is important to do deep breathing exercises before my surgery?  Deep breathing exercises strengthen your breathing muscles.  This helps you to recover after your surgery and decreases the chance of breathing complications.      How are the deep breathing exercises done?  Sit straight with your back supported.  Breathe in deeply and slowly through your nose. Your lower rib cage should expand and your abdomen may move forward.  Hold that breath for 3 to 5 seconds.  Breathe out through pursed lips, slowly and completely.  Rest and repeat 10 times every hour while awake.  Rest longer if you become dizzy or lightheaded.         Patient and Family Education             Ways You Can Help Prevent Blood Clots             This handout explains some simple things you can do to help prevent blood clots.      Blood clots are blockages that can form in the body's veins. When a blood clot forms in your deep veins, it may be called a deep vein thrombosis, or DVT for short. Blood clots can happen in any part of the body where blood flows, but they are most common in the arms and legs. If a piece of a blood clot breaks free and travels to the lungs, it is called a pulmonary embolus (PE). A PE can be a very serious problem.         Being in the hospital or having surgery can raise your  chances of getting a blood clot because you may not be well enough to move around as much as you normally do.         Ways you can help prevent blood clots in the hospital         Wearing SCDs. SCDs stands for Sequential Compression Devices.   SCDs are special sleeves that wrap around your legs  They attach to a pump that fills them with air to gently squeeze your legs every few minutes.   This helps return the blood in your legs to your heart.   SCDs should only be taken off when walking or bathing.   SCDs may not be comfortable, but they can help save your life.               Wearing compression stockings - if your doctor orders them. These special snug fitting stockings gently squeeze your legs to help blood flow.       Walking. Walking helps move the blood in your legs.   If your doctor says it is ok, try walking the halls at least   5 times a day. Ask us to help you get up, so you don't fall.      Taking any blood thinning medicines your doctor orders.        Page 1 of 2     [facility]; 3/23   Ways you can help prevent blood clots at home       Wearing compression stockings - if your doctor orders them. ? Walking - to help move the blood in your legs.       Taking any blood thinning medicines your doctor orders.      Signs of a blood clot or PE      Tell your doctor or nurse know right away if you have of the problems listed below.    If you are at home, seek medical care right away. Call 911 for chest pain or problems breathing.               Signs of a blood clot (DVT) - such as pain,  swelling, redness or warmth in your arm or leg      Signs of a pulmonary embolism (PE) - such as chest     pain or feeling short of breath

## 2025-03-05 NOTE — H&P (VIEW-ONLY)
CPM/PAT Evaluation       Name: Lorraine Iniguez (Lorraine Iniguez)  /Age: 1950/74 y.o.     Visit Type:   In-Person       Chief Complaint: Postlaminectomy syndrome of lumbar region    HPI  Pt is scheduled a 74 year old female with Postlaminectomy syndrome of the lumbar region. Pt is being evaluated in CPM in anticipation of a Thoracic Lami for Paddle Spinal Cord Stimulator Electrode and Generator Implant ([employer]) with Dr. Marquez on 3/13/25.  Past Medical History:   Diagnosis Date    Chronic pain disorder     s/p L2-L5 lumbar fusion    Coronary artery disease     Hypertension     Lumbar spondylosis with myelopathy     Olecranon bursitis     Osteoporosis     Postlaminectomy syndrome of lumbar region     RA (rheumatoid arthritis)     Scoliosis     Squamous cell lung cancer ([facility])     s/p RLL wedge resection 22       Past Surgical History:   Procedure Laterality Date    BREAST BIOPSY Left 40years    benign    COLONOSCOPY      CT GUIDED IMAGING FOR NEEDLE PLACEMENT  10/12/2017    CT GUIDED IMAGING FOR NEEDLE PLACEMENT [facility]    CT GUIDED IMAGING FOR NEEDLE PLACEMENT  07/10/2020    CT GUIDED IMAGING FOR NEEDLE PLACEMENT [facility]    CT GUIDED PERCUTANEOUS BIOPSY LUNG  2022    CT GUIDED PERCUTANEOUS BIOPSY LUNG [facility]    HYSTERECTOMY  1995    LUMBAR FUSION  2016    s/p L2-L5 lumbar fusion    LUNG REMOVAL, PARTIAL  2022    RLL wedge resection       Patient  has no history on file for sexual activity.    No family history on file.    No Known Allergies    Prior to Admission medications    Medication Sig Start Date End Date Taking? Authorizing Provider   ascorbic acid (Vitamin C) 1,000 mg tablet Take 1 tablet (1,000 mg) by mouth once daily.  for 30 day(s)    Historical Provider, MD   BABY ASPIRIN ORAL Baby Aspirin    Historical Provider, MD   calcium carbonate-vitamin D3 (Caltrate with Vitamin D3) 600 mg-20 mcg (800 unit) tablet Take 1  tablet by mouth 2 times a day. 5/11/21   Historical Provider, MD   cetirizine (ZyrTEC) 10 mg tablet Take 1 tablet (10 mg) by mouth once daily. 5/23/18   Historical Provider, MD   coenzyme Q-10 (Co Q-10) 200 mg capsule Take 1 capsule (200 mg) by mouth once daily. 5/11/21   Historical Provider, MD   cyanocobalamin (Vitamin B-12) 1,000 mcg tablet Take 0.5 tablets (500 mcg) by mouth once daily. 5/11/21   Historical Provider, MD   denosumab (PROLIA SUBQ) Inject 60 mg under the skin. q6 mo 5/11/21   Historical Provider, MD   eszopiclone (Lunesta) 3 mg tablet Take 1 tablet (3 mg) by mouth once daily at bedtime.    Historical Provider, MD   folic acid (Folvite) 1 mg tablet Take 2 tablets (2 mg) by mouth once daily.    Historical Provider, MD   gabapentin (Neurontin) 300 mg capsule Take 1 capsule (300 mg) by mouth 3 times a day. 8/24/15   Historical Provider, MD   glucosamine/chondr hayes A sod (OSTEO BI-FLEX ORAL) 0 Refill(s), Type: Maintenance 5/11/21   Historical Provider, MD   hydroxychloroquine (Plaquenil) 200 mg tablet Take 1 tablet (200 mg) by mouth once daily.    Historical Provider, MD   Lactobacil 2/Bifido 11/S.therm (HIGH POTENCY PROBIOTIC ORAL) Take by mouth once daily. 5/11/21   Historical Provider, MD   lisinopril 20 mg tablet Take 1 tablet (20 mg) by mouth once daily. 8/16/24   Dez Person MD   magnesium gluconate (Magonate) 27.5 mg magne- sium (500 mg) tablet Take 500 mg by mouth 2 times a day. 5/11/21   Historical Provider, MD   NON FORMULARY Elderberry 500 MG as directed Orally    Historical Provider, MD   omega 3-dha-epa-fish oil (Fish OiL) 1,200 (144-216) mg capsule Take 2 capsules (2,400 mg) by mouth once daily.    Historical Provider, MD   omeprazole (PriLOSEC) 20 mg DR capsule Take 1 capsule (20 mg) by mouth once daily. Do not crush or chew. 12/19/24 12/19/25  Dez Person MD   Orencia ClickJect 125 mg/mL auto-injector auto-injection  8/29/23   Historical Provider, MD   POTASSIUM ORAL Take 99  mg by mouth once daily. for 30 day(s)    Historical Provider, MD Keane, PF, 15 mg/0.3 mL auto-injector  7/20/23   Historical Provider, MD   rosuvastatin (Crestor) 20 mg tablet TAKE 1 TABLET BY MOUTH EVERY DAY 11/22/24   Dez Person MD   torsemide (Demadex) 10 mg tablet Take 1 tablet (10 mg) by mouth once daily. 3/20/24   Dez Person MD   traZODone (Desyrel) 300 mg tablet Take 1 tablet (300 mg) by mouth once daily at bedtime.  for 30 day(s) 5/23/18   Historical Provider, MD   vitamin B complex (SUPER B-50 COMPLEX ORAL) Super B Complex    Historical Provider, MD   zinc gluconate 50 mg tablet Take 1 tablet (50 mg) by mouth once daily.  for 30 day(s) 5/11/21   Historical Provider, MD FUNG ROS:   Constitutional:   neg    Neuro/Psych:   neg    Eyes:   neg    Ears:    hearing loss   hearing aides  Nose:   neg    Mouth:   neg    Throat:   neg    Neck:   neg    Cardio:   neg    Respiratory:   neg    Endocrine:   neg    GI:   neg    :   neg    Musculoskeletal:    Back pain 8/10   arthralgias   myalgias  Hematologic:   neg    Skin:  neg        Physical Exam  Vitals reviewed.   Constitutional:       Appearance: Normal appearance.   HENT:      Head: Normocephalic and atraumatic.      Nose: Nose normal.      Mouth/Throat:      Mouth: Mucous membranes are moist.   Cardiovascular:      Rate and Rhythm: Normal rate and regular rhythm.      Pulses: Normal pulses.      Heart sounds: Normal heart sounds.   Pulmonary:      Effort: Pulmonary effort is normal.      Breath sounds: Normal breath sounds.   Abdominal:      Palpations: Abdomen is soft.   Musculoskeletal:         General: Normal range of motion.      Cervical back: Normal range of motion.      Comments: Ambulates with cane   Skin:     General: Skin is warm.   Neurological:      General: No focal deficit present.      Mental Status: She is alert and oriented to person, place, and time.   Psychiatric:         Mood and Affect: Mood normal.          "Behavior: Behavior normal.          Airway    Testing/Diagnostic:   MRI LUMBAR SPINE; 5/24/24   IMPRESSION:   No substantial residual canal stenosis following L2-L5 dorsal   decompression with posterior farhana and pedicle screw fixation.   Residual degenerative foraminal stenosis is most pronounced and mild to   moderate on the left at L4-L5.   Moderate degenerative foraminal stenosis on the right at T11-T12.   Anatomic Lumbar Variant: None.  L4-5 is considered the level of the iliac   crest and assume there are 5 lumbar-type vertebrae.     CT LUMBAR SPINE; 5/24/24  IMPRESSION:   Chronic postsurgical changes of L2 L5 laminectomy with posterior farhana and   pedicle screw fixation.  No evidence of mature osseous fusion across the   L2-L3 or L4-L5 disc spaces.  Faint lucency surrounding the bilateral L5   pedicle screws may reflect loosening and/or abnormal motion.   No substantial residual canal stenosis identified.  Foraminal stenosis is   fully characterized on MRI performed the same day.   Anatomic Lumbar Variant: None.  L4-5 is considered the level of the iliac   crest and assume there are 5 lumbar-type vertebrae.       Patient Specialist/PCP:   PCP: Dez Person MD 2/5/25    Oncology: Luis M Allison APRN-CNP 11/14/24 Management of newly diagnosed working stage IA3  ([id number]/1c ) squamous cell lung cancer.      Neurosurgery: Luther Marquez MD 11/4/24 referred from Dr. Sparks for lumbar spondylosis without myelopathy. Patient has hx of chronic low back pain s/p L2-L5 lumbar fusion. She presents today to discuss permanent placement of spinal cord stimulation.     Pain Management: Real Vazquez MD at [facility] 7/23/24 presents with: Back Pain: Upper and low back normally follows with Dr. Melara.    Rheumatology: Betsy Prabhakar MD  Visit Vitals  /82   Pulse 80   Temp 36.8 °C (98.2 °F) (Oral)   Ht 1.727 m (5' 8\")   Wt 94.3 kg (208 lb)   SpO2 97%   BMI 31.63 kg/m²   OB Status Oopherectomy   Smoking Status Former   BSA " 2.13 m²       DASI Risk Score      Flowsheet Row Pre-Admission Testing from 3/5/2025 in [facility]   Can you take care of yourself (eat, dress, bathe, or use toilet)?  2.75 filed at 03/05/2025 1308   Can you walk indoors, such as around your house? 1.75 filed at 03/05/2025 1308   Can you walk a block or two on level ground?  0 filed at 03/05/2025 1308   Can you climb a flight of stairs or walk up a hill? 0 filed at 03/05/2025 1308   Can you run a short distance? 0 filed at 03/05/2025 1308   Can you do light work around the house like dusting or washing dishes? 2.7 filed at 03/05/2025 1308   Can you do moderate work around the house like vacuuming, sweeping floors or carrying groceries? 3.5 filed at 03/05/2025 1308   Can you do heavy work around the house like scrubbing floors or lifting and moving heavy furniture?  8 filed at 03/05/2025 1308   Can you do yard work like raking leaves, weeding or pushing a mower? 4.5 filed at 03/05/2025 1308   Can you have sexual relations? 0 filed at 03/05/2025 1308   Can you participate in moderate recreational activities like golf, bowling, dancing, doubles tennis or throwing a baseball or football? 0 filed at 03/05/2025 1308   Can you participate in strenous sports like swimming, singles tennis, football, basketball, or skiing? 0 filed at 03/05/2025 1308   DASI SCORE 23.2 filed at 03/05/2025 1308   METS Score (Will be calculated only when all the questions are answered) 5.6 filed at 03/05/2025 1308          Caprini DVT Assessment    No data to display       Modified Frailty Index    No data to display       [id number] Stroke Risk Points  Current as of just now        N/A 0 to 9 Points:      Last Change: N/A          The [id number] risk score (Lip BIMAL, et al. 2009. © 2010 American [employer]) quantifies the risk of stroke for a patient with atrial fibrillation. For patients without atrial fibrillation or under the age of 18 this score appears as  N/A. Higher score values generally indicate higher risk of stroke.        This score is not applicable to this patient. Components are not calculated.          Revised Cardiac Risk Index    No data to display       Apfel Simplified Score    No data to display       Risk Analysis Index Results This Encounter    No data found in the last 10 encounters.       Stop Bang Score      Flowsheet Row Pre-Admission Testing from 3/5/2025 in [facility]   Do you snore loudly? 0 filed at 03/05/2025 1307   Do you often feel tired or fatigued after your sleep? 0 filed at 03/05/2025 1307   Has anyone ever observed you stop breathing in your sleep? 0 filed at 03/05/2025 1307   Do you have or are you being treated for high blood pressure? 1 filed at 03/05/2025 1307   Recent BMI (Calculated) 31 filed at 03/05/2025 1307   Is BMI greater than 35 kg/m2? 0=No filed at 03/05/2025 1307   Age older than 50 years old? 1=Yes filed at 03/05/2025 1307   Is your neck circumference greater than 17 inches (Male) or 16 inches (Female)? 0 filed at 03/05/2025 1307   Gender - Male 0=No filed at 03/05/2025 1307   STOP-BANG Total Score 2 filed at 03/05/2025 1307          Prodigy: High Risk  Total Score: 12              Prodigy Age Score           ARISCAT Score for Postoperative Pulmonary Complications    No data to display       Robbins Perioperative Risk for Myocardial Infarction or Cardiac Arrest (MENDEZ)    No data to display         Assessment and Plan:     Anesthesia  The patient denies problems with anesthesia in the past such as PONV, prolonged sedation, awareness, dental damage, aspiration, cardiac arrest, difficult intubation, or unexpected hospital admissions.     Neurology  The patient has postlaminectomy syndrome, and scoliosis. The patient is at increased risk for postoperative delirium secondary to age 65 or older, sensory Impairment, decreased functional status. The patient is at increased risk for perioperative stroke secondary  to hypertension , increased age, female gender, general anesthesia, operative time >2.5 hours. Handouts for preoperative brain exercises given to patient.    HEENT/Airway  No diagnoses, significant findings on chart review, clinical presentation, or evaluation. No documented or reported history of airway difficulty.     Cardiovascular  The patient is scheduled for non-cardiac surgery associated with elevated risk. The patient has no major cardiac contraindications to non- cardiac surgery.  RCRI  The patient meets 0 RCRI criteria and therefor has a 3.9% risk of major adverse cardiac complications.  METS  The patient's functional capacity is greater than 4 METS.  EKG  The patient has no EKG or echocardiographic changes concerning for myocardial ischemia.   Heart Failure  The patient has no known history of heart failure.  Additionally, the patient reports no symptoms of heart failure and demonstrates no signs of heart failure.  Hypertension Evaluation  The patient has a known history of hypertension that is controlled.  Patient's hypertension is most consistent with stage 1.  Heart Rhythm Evaluation  The patient has no history of arrhythmias.  Heart Valve Evaluation  The patient has no known history of valvular heart disease. The patient has no symptoms or physical exam findings to suggest valvular heart disease.  Cardiology Evaluation  The patient is not followed by cardiology.    The patient has a 30-day risk for MACE of 0 predictors, 3.9% risk for cardiac death, nonfatal myocardial infarction, and nonfatal cardiac arrest.  MENDEZ score which indicates a 0.1% risk of intraoperative or 30-day postoperative MACE (major adverse cardiac event).    Pulmonary  The patient has lung cancer s/p resection. The patient is at increased risk of perioperative pulmonary complications secondary to neurosurgery, advanced age greater than 60.    The patient has a stop bang score of 2, which places patient at low risk for having  AISLINN.    ARISCAT 26, Intermediate, 13.3% risk of in-hospital postoperative pulmonary complications  PRODIGY 12, intermediate risk of respiratory depression episode. Patient given PI sheet for preoperative deep breathing exercises.    Hematology  No diagnoses or significant findings on chart review or clinical presentation and evaluation.  Antiplatelet management   The patient is currently receiving antiplatelet therapy for CAD.  Anticoagulation management  The patient is not currently receiving anticoagulation therapy.    Caprini score 12, high risk of perioperative VTE.     Patient instructed to ambulate as soon as possible postoperatively to decrease thromboembolic risk. Initiate mechanical DVT prophylaxis as soon as possible and initiate chemical prophylaxis when deemed safe from a bleeding standpoint post surgery.     Transfusion Evaluation  A type and screen was obtained given the likelihood for perioperative transfusion of blood or blood products.    Gastrointestinal  The patient has GERD    Eat 10- 0,  self-perceived oropharyngeal dysphagia scale (0-40)     Genitourinary  No diagnoses or significant findings on chart review or clinical presentation and evaluation.    Renal  No renal diagnoses or significant findings on chart review or clinical presentation and evaluation. The patient has specific risk factors associated with increased risk of perioperative renal complications related to age greater than 55, hypertension.    Musculoskeletal  The patient has scoliosis, postlaminectomy syndrome    Endocrine  Diabetes Evaluation  The patient has no history of diabetes mellitus.  Thyroid Disease Evaluation  The patient has no history of thyroid disease.    ID  No diagnoses or significant findings on chart review or clinical presentation and evaluation. MRSA screening obtained. Prescriptions and instructions given for Hibiclens and Peridex.    -Preoperative medication instructions were provided and reviewed with the  patient.  Any additional testing or evaluation was explained to the patient.  NPO Instructions were discussed, and the patient's questions were answered prior to conclusion of this encounter. Patient verbalized understanding of preoperative instructions. After Visit Summary given.      Recent Results (from the past 24 hours)   Type And Screen Is this order related to pregnancy or an upcoming surgery? Yes; Where will this surgery/delivery be performed? [facility]; What is the date of the surgery? 3/13/2025; Has this patient ever had a transfusion? No; Has t...    Collection Time: 03/05/25  1:51 PM   Result Value Ref Range    ABO TYPE B     Rh TYPE NEG     ANTIBODY SCREEN NEG    CBC    Collection Time: 03/05/25  1:51 PM   Result Value Ref Range    WBC 5.8 4.4 - 11.3 x10*3/uL    nRBC 0.0 0.0 - 0.0 /100 WBCs    RBC 4.42 4.00 - 5.20 x10*6/uL    Hemoglobin 13.3 12.0 - 16.0 g/dL    Hematocrit 41.5 36.0 - 46.0 %    MCV 94 80 - 100 fL    MCH 30.1 26.0 - 34.0 pg    MCHC 32.0 32.0 - 36.0 g/dL    RDW 13.0 11.5 - 14.5 %    Platelets 196 150 - 450 x10*3/uL   Basic Metabolic Panel    Collection Time: 03/05/25  1:51 PM   Result Value Ref Range    Glucose 93 74 - 99 mg/dL    Sodium 139 136 - 145 mmol/L    Potassium 4.2 3.5 - 5.3 mmol/L    Chloride 103 98 - 107 mmol/L    Bicarbonate 26 21 - 32 mmol/L    Anion Gap 14 10 - 20 mmol/L    Urea Nitrogen 13 6 - 23 mg/dL    Creatinine 0.81 0.50 - 1.05 mg/dL    eGFR 76 >60 mL/min/1.73m*2    Calcium 9.3 8.6 - 10.6 mg/dL

## 2025-03-07 LAB — STAPHYLOCOCCUS SPEC CULT: NORMAL

## 2025-03-12 ENCOUNTER — ANESTHESIA EVENT (OUTPATIENT)
Dept: OPERATING ROOM | Facility: HOSPITAL | Age: 75
End: 2025-03-12
Payer: MEDICARE

## 2025-03-13 ENCOUNTER — HOSPITAL ENCOUNTER (OUTPATIENT)
Dept: NEUROLOGY | Facility: HOSPITAL | Age: 75
Setting detail: OUTPATIENT SURGERY
Discharge: HOME | End: 2025-03-13
Payer: MEDICARE

## 2025-03-13 ENCOUNTER — HOSPITAL ENCOUNTER (OUTPATIENT)
Facility: HOSPITAL | Age: 75
Setting detail: OUTPATIENT SURGERY
Discharge: HOME | End: 2025-03-13
Attending: NEUROLOGICAL SURGERY | Admitting: NEUROLOGICAL SURGERY
Payer: MEDICARE

## 2025-03-13 ENCOUNTER — APPOINTMENT (OUTPATIENT)
Dept: RADIOLOGY | Facility: HOSPITAL | Age: 75
End: 2025-03-13
Payer: MEDICARE

## 2025-03-13 ENCOUNTER — ANESTHESIA (OUTPATIENT)
Dept: OPERATING ROOM | Facility: HOSPITAL | Age: 75
End: 2025-03-13
Payer: MEDICARE

## 2025-03-13 VITALS
SYSTOLIC BLOOD PRESSURE: 120 MMHG | RESPIRATION RATE: 16 BRPM | TEMPERATURE: 97.7 F | OXYGEN SATURATION: 92 % | DIASTOLIC BLOOD PRESSURE: 62 MMHG | WEIGHT: 208.34 LBS | HEART RATE: 70 BPM | BODY MASS INDEX: 31.57 KG/M2 | HEIGHT: 68 IN

## 2025-03-13 DIAGNOSIS — G89.18 ACUTE POST-OPERATIVE PAIN: ICD-10-CM

## 2025-03-13 DIAGNOSIS — M96.1 POSTLAMINECTOMY SYNDROME OF LUMBAR REGION: Primary | ICD-10-CM

## 2025-03-13 PROBLEM — M41.9 SCOLIOSIS: Status: ACTIVE | Noted: 2025-03-13

## 2025-03-13 PROBLEM — M54.50 CHRONIC LOW BACK PAIN: Status: ACTIVE | Noted: 2025-03-13

## 2025-03-13 PROBLEM — K21.9 GASTROESOPHAGEAL REFLUX DISEASE: Status: ACTIVE | Noted: 2025-03-13

## 2025-03-13 PROBLEM — G89.29 CHRONIC LOW BACK PAIN: Status: ACTIVE | Noted: 2025-03-13

## 2025-03-13 LAB
ABO GROUP (TYPE) IN BLOOD: NORMAL
RH FACTOR (ANTIGEN D): NORMAL

## 2025-03-13 PROCEDURE — C1787 PATIENT PROGR, NEUROSTIM: HCPCS | Performed by: NEUROLOGICAL SURGERY

## 2025-03-13 PROCEDURE — 2500000004 HC RX 250 GENERAL PHARMACY W/ HCPCS (ALT 636 FOR OP/ED): Performed by: NEUROLOGICAL SURGERY

## 2025-03-13 PROCEDURE — 7100000009 HC PHASE TWO TIME - INITIAL BASE CHARGE: Performed by: NEUROLOGICAL SURGERY

## 2025-03-13 PROCEDURE — 63685 INS/RPLC SPI NPG/RCVR POCKET: CPT | Performed by: NEUROLOGICAL SURGERY

## 2025-03-13 PROCEDURE — A63655 PR SURG IMPLNT NEUROELECT,EPIDURAL: Performed by: ANESTHESIOLOGY

## 2025-03-13 PROCEDURE — 3600000009 HC OR TIME - EACH INCREMENTAL 1 MINUTE - PROCEDURE LEVEL FOUR: Performed by: NEUROLOGICAL SURGERY

## 2025-03-13 PROCEDURE — 7100000001 HC RECOVERY ROOM TIME - INITIAL BASE CHARGE: Performed by: NEUROLOGICAL SURGERY

## 2025-03-13 PROCEDURE — 2500000004 HC RX 250 GENERAL PHARMACY W/ HCPCS (ALT 636 FOR OP/ED)

## 2025-03-13 PROCEDURE — 3600000004 HC OR TIME - INITIAL BASE CHARGE - PROCEDURE LEVEL FOUR: Performed by: NEUROLOGICAL SURGERY

## 2025-03-13 PROCEDURE — 2500000005 HC RX 250 GENERAL PHARMACY W/O HCPCS: Performed by: NEUROLOGICAL SURGERY

## 2025-03-13 PROCEDURE — 2500000001 HC RX 250 WO HCPCS SELF ADMINISTERED DRUGS (ALT 637 FOR MEDICARE OP): Performed by: ANESTHESIOLOGY

## 2025-03-13 PROCEDURE — 7100000002 HC RECOVERY ROOM TIME - EACH INCREMENTAL 1 MINUTE: Performed by: NEUROLOGICAL SURGERY

## 2025-03-13 PROCEDURE — 2720000007 HC OR 272 NO HCPCS: Performed by: NEUROLOGICAL SURGERY

## 2025-03-13 PROCEDURE — 3700000001 HC GENERAL ANESTHESIA TIME - INITIAL BASE CHARGE: Performed by: NEUROLOGICAL SURGERY

## 2025-03-13 PROCEDURE — 2780000003 HC OR 278 NO HCPCS: Performed by: NEUROLOGICAL SURGERY

## 2025-03-13 PROCEDURE — 99100 ANES PT EXTEME AGE<1 YR&>70: CPT | Performed by: ANESTHESIOLOGY

## 2025-03-13 PROCEDURE — C1820 GENERATOR NEURO RECHG BAT SY: HCPCS | Performed by: NEUROLOGICAL SURGERY

## 2025-03-13 PROCEDURE — 7100000010 HC PHASE TWO TIME - EACH INCREMENTAL 1 MINUTE: Performed by: NEUROLOGICAL SURGERY

## 2025-03-13 PROCEDURE — 63655 IMPLANT NEUROELECTRODES: CPT | Performed by: NEUROLOGICAL SURGERY

## 2025-03-13 PROCEDURE — 95955 EEG DURING SURGERY: CPT

## 2025-03-13 PROCEDURE — 2500000001 HC RX 250 WO HCPCS SELF ADMINISTERED DRUGS (ALT 637 FOR MEDICARE OP)

## 2025-03-13 PROCEDURE — 36415 COLL VENOUS BLD VENIPUNCTURE: CPT | Performed by: ANESTHESIOLOGY

## 2025-03-13 PROCEDURE — 3700000002 HC GENERAL ANESTHESIA TIME - EACH INCREMENTAL 1 MINUTE: Performed by: NEUROLOGICAL SURGERY

## 2025-03-13 PROCEDURE — C1778 LEAD, NEUROSTIMULATOR: HCPCS | Performed by: NEUROLOGICAL SURGERY

## 2025-03-13 DEVICE — IMPLANTABLE DEVICE: Type: IMPLANTABLE DEVICE | Site: BACK | Status: FUNCTIONAL

## 2025-03-13 DEVICE — GENERATOR KIT, WAVEWRITER ALPHA,  32 IPG: Type: IMPLANTABLE DEVICE | Site: BACK | Status: FUNCTIONAL

## 2025-03-13 RX ORDER — HYDROMORPHONE HYDROCHLORIDE 0.2 MG/ML
0.2 INJECTION INTRAMUSCULAR; INTRAVENOUS; SUBCUTANEOUS EVERY 5 MIN PRN
Status: DISCONTINUED | OUTPATIENT
Start: 2025-03-13 | End: 2025-03-13 | Stop reason: HOSPADM

## 2025-03-13 RX ORDER — ACETAMINOPHEN 325 MG/1
650 TABLET ORAL EVERY 6 HOURS PRN
Qty: 112 TABLET | Refills: 0 | Status: SHIPPED | OUTPATIENT
Start: 2025-03-13 | End: 2025-03-27

## 2025-03-13 RX ORDER — LIDOCAINE HYDROCHLORIDE 20 MG/ML
INJECTION, SOLUTION INFILTRATION; PERINEURAL AS NEEDED
Status: DISCONTINUED | OUTPATIENT
Start: 2025-03-13 | End: 2025-03-13

## 2025-03-13 RX ORDER — DEXMEDETOMIDINE HYDROCHLORIDE 4 UG/ML
INJECTION, SOLUTION INTRAVENOUS CONTINUOUS PRN
Status: DISCONTINUED | OUTPATIENT
Start: 2025-03-13 | End: 2025-03-13

## 2025-03-13 RX ORDER — POLYMYXIN B 500000 [USP'U]/1
INJECTION, POWDER, LYOPHILIZED, FOR SOLUTION INTRAMUSCULAR; INTRATHECAL; INTRAVENOUS; OPHTHALMIC AS NEEDED
Status: DISCONTINUED | OUTPATIENT
Start: 2025-03-13 | End: 2025-03-13 | Stop reason: HOSPADM

## 2025-03-13 RX ORDER — ACETAMINOPHEN 325 MG/1
650 TABLET ORAL EVERY 4 HOURS PRN
Status: DISCONTINUED | OUTPATIENT
Start: 2025-03-13 | End: 2025-03-13 | Stop reason: HOSPADM

## 2025-03-13 RX ORDER — CEFAZOLIN SODIUM 2 G/100ML
2 INJECTION, SOLUTION INTRAVENOUS ONCE
Status: DISCONTINUED | OUTPATIENT
Start: 2025-03-13 | End: 2025-03-13 | Stop reason: HOSPADM

## 2025-03-13 RX ORDER — ALBUTEROL SULFATE 0.83 MG/ML
2.5 SOLUTION RESPIRATORY (INHALATION) ONCE AS NEEDED
Status: DISCONTINUED | OUTPATIENT
Start: 2025-03-13 | End: 2025-03-13 | Stop reason: HOSPADM

## 2025-03-13 RX ORDER — SODIUM CHLORIDE, SODIUM LACTATE, POTASSIUM CHLORIDE, CALCIUM CHLORIDE 600; 310; 30; 20 MG/100ML; MG/100ML; MG/100ML; MG/100ML
100 INJECTION, SOLUTION INTRAVENOUS CONTINUOUS
Status: DISCONTINUED | OUTPATIENT
Start: 2025-03-13 | End: 2025-03-13 | Stop reason: HOSPADM

## 2025-03-13 RX ORDER — KETAMINE HYDROCHLORIDE 10 MG/ML
INJECTION, SOLUTION INTRAMUSCULAR; INTRAVENOUS AS NEEDED
Status: DISCONTINUED | OUTPATIENT
Start: 2025-03-13 | End: 2025-03-13

## 2025-03-13 RX ORDER — PHENYLEPHRINE HCL IN 0.9% NACL 0.4MG/10ML
SYRINGE (ML) INTRAVENOUS AS NEEDED
Status: DISCONTINUED | OUTPATIENT
Start: 2025-03-13 | End: 2025-03-13

## 2025-03-13 RX ORDER — FENTANYL CITRATE 50 UG/ML
INJECTION, SOLUTION INTRAMUSCULAR; INTRAVENOUS AS NEEDED
Status: DISCONTINUED | OUTPATIENT
Start: 2025-03-13 | End: 2025-03-13

## 2025-03-13 RX ORDER — LIDOCAINE HYDROCHLORIDE AND EPINEPHRINE 5; 5 MG/ML; UG/ML
INJECTION, SOLUTION INFILTRATION; PERINEURAL AS NEEDED
Status: DISCONTINUED | OUTPATIENT
Start: 2025-03-13 | End: 2025-03-13 | Stop reason: HOSPADM

## 2025-03-13 RX ORDER — OXYCODONE HYDROCHLORIDE 5 MG/1
5 TABLET ORAL ONCE
Status: COMPLETED | OUTPATIENT
Start: 2025-03-13 | End: 2025-03-13

## 2025-03-13 RX ORDER — ONDANSETRON 4 MG/1
4 TABLET, ORALLY DISINTEGRATING ORAL EVERY 8 HOURS PRN
Qty: 20 TABLET | Refills: 0 | Status: SHIPPED | OUTPATIENT
Start: 2025-03-13

## 2025-03-13 RX ORDER — LABETALOL HYDROCHLORIDE 5 MG/ML
5 INJECTION, SOLUTION INTRAVENOUS ONCE AS NEEDED
Status: DISCONTINUED | OUTPATIENT
Start: 2025-03-13 | End: 2025-03-13 | Stop reason: HOSPADM

## 2025-03-13 RX ORDER — OXYCODONE HYDROCHLORIDE 5 MG/1
5 TABLET ORAL EVERY 6 HOURS PRN
Qty: 15 TABLET | Refills: 0 | Status: SHIPPED | OUTPATIENT
Start: 2025-03-13

## 2025-03-13 RX ORDER — CEFAZOLIN 1 G/1
INJECTION, POWDER, FOR SOLUTION INTRAVENOUS AS NEEDED
Status: DISCONTINUED | OUTPATIENT
Start: 2025-03-13 | End: 2025-03-13

## 2025-03-13 RX ORDER — BUPIVACAINE HYDROCHLORIDE 2.5 MG/ML
INJECTION, SOLUTION INFILTRATION; PERINEURAL AS NEEDED
Status: DISCONTINUED | OUTPATIENT
Start: 2025-03-13 | End: 2025-03-13 | Stop reason: HOSPADM

## 2025-03-13 RX ORDER — ONDANSETRON HYDROCHLORIDE 2 MG/ML
4 INJECTION, SOLUTION INTRAVENOUS ONCE AS NEEDED
Status: COMPLETED | OUTPATIENT
Start: 2025-03-13 | End: 2025-03-13

## 2025-03-13 RX ORDER — PROPOFOL 10 MG/ML
INJECTION, EMULSION INTRAVENOUS CONTINUOUS PRN
Status: DISCONTINUED | OUTPATIENT
Start: 2025-03-13 | End: 2025-03-13

## 2025-03-13 RX ORDER — AMOXICILLIN 250 MG
1 CAPSULE ORAL DAILY
Qty: 14 TABLET | Refills: 0 | Status: SHIPPED | OUTPATIENT
Start: 2025-03-13 | End: 2025-03-27

## 2025-03-13 RX ORDER — GABAPENTIN 300 MG/1
CAPSULE ORAL AS NEEDED
Status: DISCONTINUED | OUTPATIENT
Start: 2025-03-13 | End: 2025-03-13

## 2025-03-13 RX ORDER — ROCURONIUM BROMIDE 10 MG/ML
INJECTION, SOLUTION INTRAVENOUS AS NEEDED
Status: DISCONTINUED | OUTPATIENT
Start: 2025-03-13 | End: 2025-03-13

## 2025-03-13 RX ORDER — BACITRACIN 500 [USP'U]/G
OINTMENT TOPICAL AS NEEDED
Status: DISCONTINUED | OUTPATIENT
Start: 2025-03-13 | End: 2025-03-13 | Stop reason: HOSPADM

## 2025-03-13 RX ORDER — MIDAZOLAM HYDROCHLORIDE 1 MG/ML
INJECTION INTRAMUSCULAR; INTRAVENOUS AS NEEDED
Status: DISCONTINUED | OUTPATIENT
Start: 2025-03-13 | End: 2025-03-13

## 2025-03-13 RX ORDER — DROPERIDOL 2.5 MG/ML
0.62 INJECTION, SOLUTION INTRAMUSCULAR; INTRAVENOUS ONCE AS NEEDED
Status: COMPLETED | OUTPATIENT
Start: 2025-03-13 | End: 2025-03-13

## 2025-03-13 RX ORDER — HYDRALAZINE HYDROCHLORIDE 20 MG/ML
5 INJECTION INTRAMUSCULAR; INTRAVENOUS EVERY 30 MIN PRN
Status: DISCONTINUED | OUTPATIENT
Start: 2025-03-13 | End: 2025-03-13 | Stop reason: HOSPADM

## 2025-03-13 RX ORDER — ONDANSETRON HYDROCHLORIDE 2 MG/ML
INJECTION, SOLUTION INTRAVENOUS AS NEEDED
Status: DISCONTINUED | OUTPATIENT
Start: 2025-03-13 | End: 2025-03-13

## 2025-03-13 RX ADMIN — Medication 80 MCG: at 08:49

## 2025-03-13 RX ADMIN — ONDANSETRON 4 MG: 2 INJECTION INTRAMUSCULAR; INTRAVENOUS at 10:02

## 2025-03-13 RX ADMIN — ROCURONIUM 70 MG: 50 INJECTION, SOLUTION INTRAVENOUS at 07:34

## 2025-03-13 RX ADMIN — OXYCODONE 5 MG: 5 TABLET ORAL at 10:01

## 2025-03-13 RX ADMIN — ROCURONIUM 20 MG: 50 INJECTION, SOLUTION INTRAVENOUS at 08:10

## 2025-03-13 RX ADMIN — ONDANSETRON 4 MG: 2 INJECTION, SOLUTION INTRAMUSCULAR; INTRAVENOUS at 09:16

## 2025-03-13 RX ADMIN — DEXMEDETOMIDINE HYDROCHLORIDE 0.4 MCG/KG/HR: 4 INJECTION, SOLUTION INTRAVENOUS at 08:04

## 2025-03-13 RX ADMIN — PROPOFOL 75 MCG/KG/MIN: 10 INJECTION, EMULSION INTRAVENOUS at 07:42

## 2025-03-13 RX ADMIN — CEFAZOLIN 2 G: 1 INJECTION, POWDER, FOR SOLUTION INTRAMUSCULAR; INTRAVENOUS at 07:57

## 2025-03-13 RX ADMIN — ROCURONIUM 10 MG: 50 INJECTION, SOLUTION INTRAVENOUS at 08:54

## 2025-03-13 RX ADMIN — MIDAZOLAM HYDROCHLORIDE 2 MG: 1 INJECTION, SOLUTION INTRAMUSCULAR; INTRAVENOUS at 07:33

## 2025-03-13 RX ADMIN — DROPERIDOL 0.62 MG: 2.5 INJECTION, SOLUTION INTRAMUSCULAR; INTRAVENOUS at 10:43

## 2025-03-13 RX ADMIN — HYDROMORPHONE HYDROCHLORIDE 0.5 MG: 1 INJECTION, SOLUTION INTRAMUSCULAR; INTRAVENOUS; SUBCUTANEOUS at 09:52

## 2025-03-13 RX ADMIN — FENTANYL CITRATE 100 MCG: 50 INJECTION, SOLUTION INTRAMUSCULAR; INTRAVENOUS at 07:34

## 2025-03-13 RX ADMIN — Medication 20 MG: at 08:43

## 2025-03-13 RX ADMIN — PROPOFOL 110 MG: 10 INJECTION, EMULSION INTRAVENOUS at 07:34

## 2025-03-13 RX ADMIN — LIDOCAINE HYDROCHLORIDE 40 MG: 20 INJECTION, SOLUTION INFILTRATION; PERINEURAL at 07:34

## 2025-03-13 RX ADMIN — GABAPENTIN 300 MG: 300 CAPSULE ORAL at 07:05

## 2025-03-13 RX ADMIN — SODIUM CHLORIDE, SODIUM LACTATE, POTASSIUM CHLORIDE, AND CALCIUM CHLORIDE: 600; 310; 30; 20 INJECTION, SOLUTION INTRAVENOUS at 07:34

## 2025-03-13 RX ADMIN — HYDROMORPHONE HYDROCHLORIDE 0.5 MG: 1 INJECTION, SOLUTION INTRAMUSCULAR; INTRAVENOUS; SUBCUTANEOUS at 09:38

## 2025-03-13 RX ADMIN — Medication 20 MG: at 07:34

## 2025-03-13 RX ADMIN — SUGAMMADEX 200 MG: 100 INJECTION, SOLUTION INTRAVENOUS at 09:16

## 2025-03-13 RX ADMIN — DEXAMETHASONE SODIUM PHOSPHATE 8 MG: 4 INJECTION INTRA-ARTICULAR; INTRALESIONAL; INTRAMUSCULAR; INTRAVENOUS; SOFT TISSUE at 07:58

## 2025-03-13 SDOH — HEALTH STABILITY: MENTAL HEALTH: CURRENT SMOKER: 0

## 2025-03-13 ASSESSMENT — PAIN - FUNCTIONAL ASSESSMENT
PAIN_FUNCTIONAL_ASSESSMENT: 0-10

## 2025-03-13 ASSESSMENT — PAIN DESCRIPTION - LOCATION
LOCATION: BACK
LOCATION: BACK

## 2025-03-13 ASSESSMENT — PAIN SCALES - GENERAL
PAINLEVEL_OUTOF10: 8
PAINLEVEL_OUTOF10: 7
PAINLEVEL_OUTOF10: 8
PAINLEVEL_OUTOF10: 7
PAINLEVEL_OUTOF10: 5 - MODERATE PAIN
PAINLEVEL_OUTOF10: 8
PAINLEVEL_OUTOF10: 7
PAINLEVEL_OUTOF10: 8
PAINLEVEL_OUTOF10: 7

## 2025-03-13 ASSESSMENT — COLUMBIA-SUICIDE SEVERITY RATING SCALE - C-SSRS
1. IN THE PAST MONTH, HAVE YOU WISHED YOU WERE DEAD OR WISHED YOU COULD GO TO SLEEP AND NOT WAKE UP?: NO
6. HAVE YOU EVER DONE ANYTHING, STARTED TO DO ANYTHING, OR PREPARED TO DO ANYTHING TO END YOUR LIFE?: NO
2. HAVE YOU ACTUALLY HAD ANY THOUGHTS OF KILLING YOURSELF?: NO

## 2025-03-13 NOTE — ANESTHESIA PROCEDURE NOTES
Airway  Date/Time: 3/13/2025 7:37 AM  Urgency: elective    Airway not difficult    Staffing  Performed: resident   Authorized by: Sharri Handy MD    Performed by: Nicolle Gaffney MD  Patient location during procedure: OR    Indications and Patient Condition  Indications for airway management: anesthesia  Spontaneous Ventilation: absent  Sedation level: deep  Preoxygenated: yes  Patient position: sniffing  Mask difficulty assessment: 1 - vent by mask  Planned trial extubation    Final Airway Details  Final airway type: endotracheal airway      Successful airway: ETT  Cuffed: yes   Successful intubation technique: direct laryngoscopy  Facilitating devices/methods: intubating stylet  Endotracheal tube insertion site: oral  Blade: Evan  Blade size: #3  ETT size (mm): 7.0  Cormack-Lehane Classification: grade I - full view of glottis  Placement verified by: chest auscultation and capnometry   Inital cuff pressure (cm H2O): 5  Measured from: lips  ETT to lips (cm): 21  Number of attempts at approach: 1    Additional Comments  Easy to mask ventilate, grade 1 view easily obtained, 7.0 tube passed easily through cords.

## 2025-03-13 NOTE — ANESTHESIA POSTPROCEDURE EVALUATION
Patient: Lorraine Iniguez    Procedure Summary       Date: 03/13/25 Room / Location: [facility] OR 26 / Virtual [facility]    Anesthesia Start: 0719 Anesthesia Stop: 0935    Procedure: Thoracic Lami for Paddle Spinal Cord Stimulator Electrode and Generator Implant ([employer]) Diagnosis:       Postlaminectomy syndrome of lumbar region      (Postlaminectomy syndrome of lumbar region [M96.1])    Surgeons: Luther Marquez MD Responsible Provider: Sharri Handy MD    Anesthesia Type: general ASA Status: 3            Anesthesia Type: general    Vitals Value Taken Time   /79 03/13/25 0930   Temp 36 03/13/25 0936   Pulse 73 03/13/25 0934   Resp 14 03/13/25 0934   SpO2 93 % 03/13/25 0934   Vitals shown include unfiled device data.    Anesthesia Post Evaluation    Patient location during evaluation: PACU  Patient participation: complete - patient participated  Level of consciousness: awake and alert  Pain management: adequate  Multimodal analgesia pain management approach  Airway patency: patent  Two or more strategies used to mitigate risk of obstructive sleep apnea  Cardiovascular status: acceptable  Respiratory status: acceptable  Hydration status: acceptable  Postoperative Nausea and Vomiting: none        Encounter Notable Events   Notable Event Outcome Phase Comment   Vomiting Resolved in Room Intraprocedure 50cc when flipped prone airway was protectyed with ETT cuff inflated, no aspiration concern, suctioned at end of case prior to extubation

## 2025-03-13 NOTE — BRIEF OP NOTE
Date: 3/13/2025  OR Location: [facility] OR    Name: Lorraine Iniguez, : 1950, Age: 74 y.o., MRN: [id number], Sex: female    Diagnosis  Pre-op Diagnosis      * Postlaminectomy syndrome of lumbar region [M96.1] Post-op Diagnosis     * Postlaminectomy syndrome of lumbar region [M96.1]     Procedures  Thoracic Lami for Paddle Spinal Cord Stimulator Electrode and Generator Implant ([employer])  [id number] - SD CHO IMPLTJ NSTIM ELTRDS PLATE/PADDLE EDRL    Thoracic Lami for Paddle Spinal Cord Stimulator Electrode and Generator Implant ([employer])  [id number] - SD INSJ/RPLCMT SPINAL NPG/RCVR POCKET CRTJ&CONNJ      Surgeons      * Luther Marquez - Primary    Resident/Fellow/Other Assistant:  Surgeons and Role:  * No surgeons found with a matching role *    Staff:   Circulator: Lei Silverman Person: Devyn    Anesthesia Staff: Anesthesiologist: Sharri Handy MD  Anesthesia Resident: Nicolle Gaffney MD    Procedure Summary  Anesthesia: Anesthesia type not filed in the log.  ASA: III  Estimated Blood Loss: 5mL  Intra-op Medications:   Administrations occurring from 0700 to 0940 on 25:   Medication Name Total Dose   lidocaine-epinephrine (Xylocaine W/EPI) 0.5 %-1:200,000 injection 15.5 mL   bacitracin ointment 1 Application   BUPivacaine HCl (Marcaine) 0.25 % (2.5 mg/mL) injection 7 mL   polymyxin B injection 500,000 Units   ceFAZolin (Ancef) vial 1 g 2 g   dexAMETHasone (Decadron) 4 mg/mL 8 mg   dexmedeTOMIDine 4 mcg/mL in 100 mL NS infusion 17.64 mcg   dexmedeTOMIDine (Precedex) bolus from bag 10 mcg   fentaNYL (Sublimaze) injection 50 mcg/mL 100 mcg   gabapentin (Neurontin) capsule 300 mg   ketamine (Ketalar) 10 mg/mL injection 40 mg   LR bolus Cannot be calculated   lidocaine (Xylocaine) injection 2 % 40 mg   midazolam PF (Versed) injection 1 mg/mL 2 mg   phenylephrine 40 mcg/mL syringe 10 mL 80 mcg   propofol (Diprivan) injection 10 mg/mL 492.73 mg   rocuronium (ZeMuron) 50 mg/5 mL injection  100 mg              Anesthesia Record               Intraprocedure I/O Totals          Intake    Dexmedetomidine 0.00 mL    The total shown is the total volume documented since Anesthesia Start was filed.    Ketamine 0.00 mL    The total shown is the total volume documented since Anesthesia Start was filed.    Total Intake 0 mL       Output    NG/OG Tube Output 50 mL    Total Output 50 mL       Net    Net Volume -50 mL          Specimen: No specimens collected               Findings: paddle lead in position on intraoperative xray    Complications:  None; patient tolerated the procedure well.     Disposition: PACU - hemodynamically stable.  Condition: stable  Specimens Collected: No specimens collected  Attending Attestation:     Luther Marquez  Phone Number: [phone]

## 2025-03-13 NOTE — ANESTHESIA PREPROCEDURE EVALUATION
Patient: Lorraine Iniguez    Procedure Information       Date/Time: 03/13/25 0700    Procedure: Thoracic Lami for Paddle Spinal Cord Stimulator Electrode and Generator Implant ([employer]) - Trial completed by Dr. Sparks - scanned under media 10/22/24    Location: [facility] OR 26 / [facility] OR    Surgeons: Luther Marquez MD            Relevant Problems   Anesthesia (within normal limits)      Cardiac   (+) CAD (coronary artery disease)   (+) Essential (primary) hypertension   (+) Pure hyperglyceridemia      Pulmonary  Hx smoking, quit 2022   (+) Malignant neoplasm of lower lobe of right lung (Multi) (Hx of RLL wedge resection, pt states path was negative for cancer )      Neuro (within normal limits)      GI   (+) Gastroesophageal reflux disease      /Renal (within normal limits)      Liver (within normal limits)      Endocrine   (+) Obesity      Hematology (within normal limits)      Musculoskeletal   (+) Chronic low back pain   (+) Lumbosacral disc herniation   (+) Rheumatoid arthritis   (+) Scoliosis      Musculoskeletal and Injuries   (+) Postlaminectomy syndrome of lumbar region       Clinical information reviewed:    Allergies                NPO Detail:  No data recorded     Vitals:    03/13/25 0645   BP: 139/74   Pulse: 73   Resp: 16   Temp: 36 °C (96.8 °F)   SpO2: 95%       Physical Exam    Airway  Mallampati: I  TM distance: >3 FB  Neck ROM: full     Cardiovascular   Rhythm: regular  Rate: normal     Dental        Pulmonary - normal exam     Abdominal            Anesthesia Plan    History of general anesthesia?: yes  History of complications of general anesthesia?: no    ASA 3     general     The patient is not a current smoker.  Patient did not smoke on day of procedure.    intravenous induction   Anesthetic plan and risks discussed with patient.  Use of blood products discussed with patient who consented to blood products.    Plan discussed with resident and attending.

## 2025-03-13 NOTE — OP NOTE
Thoracic Lami for Paddle Spinal Cord Stimulator Electrode and Generator Implant ([employer]) Operative Note     Date: 3/13/2025  OR Location: [facility] OR    Name: Lorraine Iniguez, : 1950, Age: 74 y.o., MRN: [id number], Sex: female    Diagnosis  Pre-op Diagnosis      * Postlaminectomy syndrome of lumbar region [M96.1] Post-op Diagnosis     * Postlaminectomy syndrome of lumbar region [M96.1]     Procedures  T10 laminotomy for implantation of spinal cord stimulator paddle electrode  Implantation of left flank IPG    Surgeons      * Luther Marquez - Primary    Resident/Fellow/Other Assistant:  Surgeons and Role:  * No surgeons found with a matching role *    Staff:   Circulator: Lei Silverman Person: Devyn    Anesthesia Staff: Anesthesiologist: Sharri Handy MD  Anesthesia Resident: Nicolle Gaffney MD    Procedure Summary  Anesthesia: Anesthesia type not filed in the log.  ASA: III  Estimated Blood Loss: 25 mL  Intra-op Medications:   Administrations occurring from 0700 to 0940 on 25:   Medication Name Total Dose   lidocaine-epinephrine (Xylocaine W/EPI) 0.5 %-1:200,000 injection 15.5 mL   bacitracin ointment 1 Application   BUPivacaine HCl (Marcaine) 0.25 % (2.5 mg/mL) injection 7 mL   polymyxin B injection 500,000 Units   ceFAZolin (Ancef) vial 1 g 2 g   dexAMETHasone (Decadron) 4 mg/mL 8 mg   dexmedeTOMIDine 4 mcg/mL in 100 mL NS infusion 17.64 mcg   dexmedeTOMIDine (Precedex) bolus from bag 10 mcg   fentaNYL (Sublimaze) injection 50 mcg/mL 100 mcg   gabapentin (Neurontin) capsule 300 mg   ketamine (Ketalar) 10 mg/mL injection 40 mg   LR bolus Cannot be calculated   lidocaine (Xylocaine) injection 2 % 40 mg   midazolam PF (Versed) injection 1 mg/mL 2 mg   ondansetron 2 mg/mL 4 mg   phenylephrine 40 mcg/mL syringe 10 mL 80 mcg   propofol (Diprivan) injection 10 mg/mL 492.73 mg   rocuronium (ZeMuron) 50 mg/5 mL injection 100 mg   sugammadex (Bridion) 200 mg/2 mL injection 200 mg               Anesthesia Record               Intraprocedure I/O Totals          Intake    Dexmedetomidine 0.00 mL    The total shown is the total volume documented since Anesthesia Start was filed.    Ketamine 0.00 mL    The total shown is the total volume documented since Anesthesia Start was filed.    LR bolus 400.00 mL    Total Intake 400 mL       Output    NG/OG Tube Output 50 mL    Total Output 50 mL       Net    Net Volume 350 mL          Specimen: No specimens collected              Drains and/or Catheters: * None in log *    Tourniquet Times:         Implants:  Implants       Type Name Action Serial No.      Spinal Hardware KIT, LEAD COVER EDGE X 32 70CM - [id number] - [id number] Implanted [id number]     Implant Pulse generator kit Implanted [id number]              Findings: Tip of paddle placed at middle of T7    Indications: Lorraine Iniguez is an 74 y.o. female who is having surgery for Postlaminectomy syndrome of lumbar region [M96.1].     The patient was seen in the preoperative area. The risks, benefits, complications, treatment options, non-operative alternatives, expected recovery and outcomes were discussed with the patient. The possibilities of reaction to medication, pulmonary aspiration, injury to surrounding structures, bleeding, recurrent infection, the need for additional procedures, failure to diagnose a condition, and creating a complication requiring transfusion or operation were discussed with the patient. The patient concurred with the proposed plan, giving informed consent.  The site of surgery was properly noted/marked if necessary per policy. The patient has been actively warmed in preoperative area. Preoperative antibiotics have been ordered and given within 1 hours of incision. Venous thrombosis prophylaxis have been ordered including bilateral sequential compression devices    Procedure Details: Ms. Iniguez was seen in the preoperative holding area, where the consent was confirmed. She  was transferred to the operating room, where the anesthetic team proceeded to insert the appropriate lines, followed by administration of a general anesthetic and endotracheal intubation. The appropriate electrodes for intraoperative neuromonitoring were affixed. The patient was then placed prone on gel rolls with all pressure points padded appropriately. Fluoroscopy was used to nikki out the T9-T10 level. The surgical field was cleaned, prepped, and draped in the usual sterile fashion. Preoperative antibiotics were administered. A perioperative time-out was undertaken, and the identity of the patient and the nature of the operation were confirmed.     The incisions were infiltrated with local anesthetic with Epinephrine. We first made an incision in the left flank incision with a #10 blade and monopolar cautery until an adequate pocket size was made.     We then turned our attention to the thoracic incision. This incision was opened with a #10 blade and monopolar cautery down to the thoracodorsal fascia. We performed a subperiosteal dissection with monopolar cautery. Cerebellar retractors were inserted for exposure, and fluoroscopy confirmed the correct level. We began our bony removal with Leksell rongeurs, then switching to a high-speed drill until we identified the ligamentum flavum. This was meticulously dissected with curved curettes and Kerrison rongeurs until we could visualize the underlying epidural fat. Following this, the dura was visualized, and we expanded our opening to accommodate the paddle. We were then able to pass the paddle electrode in the anatomical midline. The top of the electrode was placed at the middle of T7.     We inserted two anchors over the leads and secured these to the underlying muscle with 2-0 silk sutures. A tunneler was then passed between this incision and the pocket, and the electrodes were passed. A final fluoroscopy shot confirmed no migration of the electrode. These distal  electrodes were inserted into the IPG device, and impedances were found to be appropriate. The redundant wire was placed behind the IPG. The remainder of the wire was left as a strain relief loop in the thoracic incision.     We achieved hemostasis with bipolar cautery and Floseal. The thoracic muscle was infiltrated with plain Marcaine. Both incisions were then thoroughly irrigated with Irrisept and antibiotic-impregnated saline.     The thoracic incision was closed with 0 Vicryl sutures for fascia and 2-0 Polysorb sutures for subcutaneous tissue, followed by a 4-0 Monocryl suture. The IPG incision was closed with 2-0 Polysorb sutures followed by a 4-0 Monocryl suture. Bacitracin ointment was applied, followed by occlusive dressings.     The patient was then returned to a supine position and extubated. She was transferred to the PACU in stable condition.    Complications:  None; patient tolerated the procedure well.    Disposition: PACU - hemodynamically stable.  Condition: stable       Attending Attestation: I was present and scrubbed for the entire procedure.    Luther Marquez  Phone Number: [phone]

## 2025-03-21 DIAGNOSIS — G89.18 POSTOPERATIVE PAIN: Primary | ICD-10-CM

## 2025-03-21 RX ORDER — TRAMADOL HYDROCHLORIDE 50 MG/1
50 TABLET ORAL EVERY 8 HOURS PRN
Qty: 15 TABLET | Refills: 0 | Status: SHIPPED | OUTPATIENT
Start: 2025-03-21

## 2025-03-21 NOTE — PROGRESS NOTES
Patient contacted office reporting she is unable to tolerate oxycodone for postop pain, s/p thoracic lami for paddle SCS implant on 3/13 by Dr. Marquez, and requesting to be switched to an alternative. We will send in a short course of postop pain medication.

## 2025-03-24 ENCOUNTER — APPOINTMENT (OUTPATIENT)
Dept: NEUROSURGERY | Facility: HOSPITAL | Age: 75
End: 2025-03-24
Payer: MEDICARE

## 2025-05-11 DIAGNOSIS — I10 ESSENTIAL (PRIMARY) HYPERTENSION: ICD-10-CM

## 2025-05-12 RX ORDER — LISINOPRIL 20 MG/1
20 TABLET ORAL DAILY
Qty: 90 TABLET | Refills: 3 | Status: SHIPPED | OUTPATIENT
Start: 2025-05-12

## 2025-05-15 DIAGNOSIS — E78.1 PURE HYPERGLYCERIDEMIA: ICD-10-CM

## 2025-05-15 RX ORDER — ROSUVASTATIN CALCIUM 20 MG/1
20 TABLET, COATED ORAL DAILY
Qty: 90 TABLET | Refills: 3 | Status: SHIPPED | OUTPATIENT
Start: 2025-05-15

## 2025-06-17 ENCOUNTER — APPOINTMENT (OUTPATIENT)
Dept: PRIMARY CARE | Facility: CLINIC | Age: 75
End: 2025-06-17
Payer: MEDICARE

## 2025-06-17 ENCOUNTER — TELEPHONE (OUTPATIENT)
Dept: PRIMARY CARE | Facility: CLINIC | Age: 75
End: 2025-06-17

## 2025-06-17 VITALS
WEIGHT: 197 LBS | DIASTOLIC BLOOD PRESSURE: 74 MMHG | BODY MASS INDEX: 30.92 KG/M2 | HEIGHT: 67 IN | OXYGEN SATURATION: 96 % | SYSTOLIC BLOOD PRESSURE: 124 MMHG | HEART RATE: 67 BPM | TEMPERATURE: 97.2 F

## 2025-06-17 DIAGNOSIS — K21.9 GASTROESOPHAGEAL REFLUX DISEASE WITHOUT ESOPHAGITIS: ICD-10-CM

## 2025-06-17 DIAGNOSIS — J43.9 PULMONARY EMPHYSEMA, UNSPECIFIED EMPHYSEMA TYPE (MULTI): ICD-10-CM

## 2025-06-17 DIAGNOSIS — M05.19: ICD-10-CM

## 2025-06-17 DIAGNOSIS — K57.92 DIVERTICULITIS: ICD-10-CM

## 2025-06-17 DIAGNOSIS — M05.79 RHEUMATOID ARTHRITIS WITH RHEUMATOID FACTOR OF MULTIPLE SITES WITHOUT ORGAN OR SYSTEMS INVOLVEMENT (MULTI): ICD-10-CM

## 2025-06-17 DIAGNOSIS — M06.9: ICD-10-CM

## 2025-06-17 DIAGNOSIS — K21.9 GASTROESOPHAGEAL REFLUX DISEASE WITHOUT ESOPHAGITIS: Primary | ICD-10-CM

## 2025-06-17 DIAGNOSIS — M45.9 RHEUMATOID ARTHRITIS INVOLVING VERTEBRA, UNSPECIFIED WHETHER RHEUMATOID FACTOR PRESENT (MULTI): ICD-10-CM

## 2025-06-17 PROCEDURE — 1125F AMNT PAIN NOTED PAIN PRSNT: CPT | Performed by: FAMILY MEDICINE

## 2025-06-17 PROCEDURE — 3078F DIAST BP <80 MM HG: CPT | Performed by: FAMILY MEDICINE

## 2025-06-17 PROCEDURE — 3074F SYST BP LT 130 MM HG: CPT | Performed by: FAMILY MEDICINE

## 2025-06-17 PROCEDURE — G2211 COMPLEX E/M VISIT ADD ON: HCPCS | Performed by: FAMILY MEDICINE

## 2025-06-17 PROCEDURE — 99214 OFFICE O/P EST MOD 30 MIN: CPT | Performed by: FAMILY MEDICINE

## 2025-06-17 PROCEDURE — 1036F TOBACCO NON-USER: CPT | Performed by: FAMILY MEDICINE

## 2025-06-17 RX ORDER — PANTOPRAZOLE SODIUM 40 MG/1
40 TABLET, DELAYED RELEASE ORAL DAILY
Qty: 90 TABLET | Refills: 1 | Status: SHIPPED | OUTPATIENT
Start: 2025-06-17 | End: 2025-12-14

## 2025-06-17 ASSESSMENT — PAIN SCALES - GENERAL: PAINLEVEL_OUTOF10: 7

## 2025-06-17 NOTE — PROGRESS NOTES
"Subjective   Patient ID: Lorraine Iniguez is a 75 y.o. female who presents for Follow-up (ED [facility] on 6/1/2025 for lower abdominal pain.  Still having some pain but seems to be improving. Patient says she was diagnosed with diverticulitis. Was given antibiotics which she does not know the name of. Looks like she was given Omnicef BID x 7 days and Flagyl TID x 7 days per ED note.    She says she has one pill left of one of the medications?//) and GERD (Patient says omeprazole not really helping).    HPI here for follow-up to ED visit on 6/1/2025.  At that time patient had about 3 days of acute abdominal pain.  She is to go away and it did not.  She went to the ED.  There was discovered that she had diverticulitis.  She was placed on cefdinir 300 mg twice daily for 7 days, and metronidazole 500 mg 3 times daily for 7 days.  Patient has half a day left of medication.  She is improved.  Her abdominal pain has resolved.  Patient is suffering from reflux.  She is currently not on omeprazole, OTC 20 mg daily.  She has breakthrough heartburn.  A visiting nurse told her to use a different medication.  Unfortunate she forgets the name of it and will call the office when she returns home.  She has never had an EGD.    Review of Systems  Constitution: Patient is negative for fever, fatigue, weight change.  HEENT: Patient is negative for change in vision, hearing, swallow.  Cardio: Patient is negative for chest pain, lower extremity edema.  Pulmonary: Patient is negative for cough, shortness of breath.  Gastrointestinal: Patient is positive for abdominal pain, resolving.  Patient is positive for reflux.    Objective   /74 (BP Location: Left arm, Patient Position: Sitting)   Pulse 67   Temp 36.2 °C (97.2 °F)   Ht 1.702 m (5' 7\")   Wt 89.4 kg (197 lb)   SpO2 96%   BMI 30.85 kg/m²     Physical Exam  General: Awake and alert no apparent distress.  HEENT: Moist oral mucosa no cervical lymphadenopathy.  Cardio: Heart " S1-S2 no murmur rub or gallop.  Pulmonary: Lungs clear to auscultation bilaterally.  Abdomen: Soft nontender.    Assessment/Plan   Problem List Items Addressed This Visit           ICD-10-CM    Gastroesophageal reflux disease - Primary needs workup.  Patient will call back with the PPI she would like to try.  However will still refer to GI. K21.9    Relevant Orders    Referral to Gastroenterology     Other Visit Diagnoses         Codes      Diverticulitis    resolving. K57.92

## 2025-06-17 NOTE — TELEPHONE ENCOUNTER
Patient stated you would fill this for her Pantoprazole 40 mg.  Send to [facility] on [address]  Last seen today

## 2025-09-04 ENCOUNTER — HOSPITAL ENCOUNTER (OUTPATIENT)
Dept: RADIOLOGY | Facility: CLINIC | Age: 75
Discharge: HOME | End: 2025-09-04
Payer: MEDICARE

## 2025-09-04 DIAGNOSIS — C34.31 MALIGNANT NEOPLASM OF LOWER LOBE OF RIGHT LUNG (MULTI): ICD-10-CM

## 2025-09-04 PROCEDURE — 71250 CT THORAX DX C-: CPT

## 2025-09-11 ENCOUNTER — APPOINTMENT (OUTPATIENT)
Dept: HEMATOLOGY/ONCOLOGY | Facility: CLINIC | Age: 75
End: 2025-09-11
Payer: MEDICARE

## (undated) DEVICE — TOOL, MR8 14CM MATCH 3MM

## (undated) DEVICE — DRAPE, CONVERTORS SPLIT SHEET 224CM X 291 CM, W/ADH SPLIT

## (undated) DEVICE — TUBING, SMOKE EVAC, 3/8 X 10 FT

## (undated) DEVICE — PROTECTOR, NERVE, ULNAR, PINK

## (undated) DEVICE — KIT, CHARGING SPINAL CORD STIMULATOR

## (undated) DEVICE — DRESSING, TRANSPARENT, TEGADERM, 4 X 4-3/4 IN

## (undated) DEVICE — Device

## (undated) DEVICE — COVER, CART, 45 X 27 X 48 IN, CLEAR

## (undated) DEVICE — SUTURE, MONOCRYL, 4-0, 18 IN, PS2, UNDYED

## (undated) DEVICE — SYRINGE, EPIDURAL, LOSS OF RESISTANCE, 10ML LUER-SLIP

## (undated) DEVICE — TAPE, SILK, DURAPORE, 3 IN X 10 YD, LF

## (undated) DEVICE — SEALANT, HEMOSTATIC, FLOSEAL, 10 ML

## (undated) DEVICE — TUNNELER, LONG

## (undated) DEVICE — CONTROL KIT, REMOTE, ALPHA FREELINK

## (undated) DEVICE — APPLICATOR, CHLORAPREP, W/ORANGE TINT, 26ML

## (undated) DEVICE — DRESSING, NON-ADHERENT, TELFA, OUCHLESS, 3 X 8 IN, STERILE

## (undated) DEVICE — BRUSH, SCRUB, W/SPONGE, W/NAIL CLEANER, DRY, LF

## (undated) DEVICE — ELECTRODE, GROUND PLATE

## (undated) DEVICE — MARKER, SKIN, RULER AND LABEL PACK, CUSTOM

## (undated) DEVICE — WOUND SYSTEM, DEBRIDEMENT & CLEANING, O.R DUOPAK

## (undated) DEVICE — ADHESIVE, SKIN, DERMABOND ADVANCED, 15CM, PEN-STYLE

## (undated) DEVICE — NEEDLE, ELECTRODE, SUBDERMAL, PAIRED, 2.0 LEAD, DISP

## (undated) DEVICE — ELECTRODE, CORKSCREW NEEDLE 1.5M LENGTH

## (undated) DEVICE — MANIFOLD, 4 PORT NEPTUNE STANDARD

## (undated) DEVICE — DRAPE, SHEET, UTILITY, NON ABSORBENT, 18 X 26 IN, LF

## (undated) DEVICE — DRESSING, TRANSPARENT, TEGADERM, 8 X 12 IN

## (undated) DEVICE — DRAPE, C-ARM 16 X 85